# Patient Record
Sex: MALE | Race: WHITE | NOT HISPANIC OR LATINO | Employment: FULL TIME | URBAN - METROPOLITAN AREA
[De-identification: names, ages, dates, MRNs, and addresses within clinical notes are randomized per-mention and may not be internally consistent; named-entity substitution may affect disease eponyms.]

---

## 2021-05-03 ENCOUNTER — TRANSFERRED RECORDS (OUTPATIENT)
Dept: MULTI SPECIALTY CLINIC | Facility: CLINIC | Age: 56
End: 2021-05-03

## 2023-01-09 ASSESSMENT — ENCOUNTER SYMPTOMS
NERVOUS/ANXIOUS: 1
COUGH: 0
HEMATURIA: 0
FEVER: 0
WEAKNESS: 0
DIZZINESS: 0
DYSURIA: 0
NAUSEA: 0
EYE PAIN: 0
PALPITATIONS: 0
FREQUENCY: 1
MYALGIAS: 1
PARESTHESIAS: 0
JOINT SWELLING: 0
HEMATOCHEZIA: 1
HEARTBURN: 0
SHORTNESS OF BREATH: 0
ABDOMINAL PAIN: 0
CHILLS: 0
SORE THROAT: 0
ARTHRALGIAS: 1
HEADACHES: 0
CONSTIPATION: 0
DIARRHEA: 0

## 2023-01-11 ENCOUNTER — OFFICE VISIT (OUTPATIENT)
Dept: FAMILY MEDICINE | Facility: CLINIC | Age: 58
End: 2023-01-11
Payer: COMMERCIAL

## 2023-01-11 VITALS
HEIGHT: 70 IN | WEIGHT: 207 LBS | RESPIRATION RATE: 20 BRPM | HEART RATE: 71 BPM | SYSTOLIC BLOOD PRESSURE: 160 MMHG | DIASTOLIC BLOOD PRESSURE: 83 MMHG | OXYGEN SATURATION: 98 % | BODY MASS INDEX: 29.63 KG/M2 | TEMPERATURE: 98.6 F

## 2023-01-11 DIAGNOSIS — F10.10 ALCOHOL CONSUMPTION BINGE DRINKING: ICD-10-CM

## 2023-01-11 DIAGNOSIS — I25.10 CORONARY ARTERY DISEASE INVOLVING NATIVE HEART WITHOUT ANGINA PECTORIS, UNSPECIFIED VESSEL OR LESION TYPE: ICD-10-CM

## 2023-01-11 DIAGNOSIS — K21.9 GASTROESOPHAGEAL REFLUX DISEASE WITHOUT ESOPHAGITIS: ICD-10-CM

## 2023-01-11 DIAGNOSIS — F41.1 GAD (GENERALIZED ANXIETY DISORDER): ICD-10-CM

## 2023-01-11 DIAGNOSIS — E78.2 MIXED HYPERLIPIDEMIA: ICD-10-CM

## 2023-01-11 DIAGNOSIS — N52.9 ERECTILE DYSFUNCTION, UNSPECIFIED ERECTILE DYSFUNCTION TYPE: ICD-10-CM

## 2023-01-11 DIAGNOSIS — I10 BENIGN ESSENTIAL HYPERTENSION: ICD-10-CM

## 2023-01-11 DIAGNOSIS — Z13.220 SCREENING FOR HYPERLIPIDEMIA: ICD-10-CM

## 2023-01-11 DIAGNOSIS — Z00.00 ROUTINE HISTORY AND PHYSICAL EXAMINATION OF ADULT: Primary | ICD-10-CM

## 2023-01-11 PROBLEM — Z87.19 HISTORY OF DIVERTICULITIS: Status: ACTIVE | Noted: 2023-01-11

## 2023-01-11 LAB
ALBUMIN SERPL-MCNC: 4.1 G/DL (ref 3.4–5)
ALP SERPL-CCNC: 99 U/L (ref 40–150)
ALT SERPL W P-5'-P-CCNC: 37 U/L (ref 0–70)
ANION GAP SERPL CALCULATED.3IONS-SCNC: 3 MMOL/L (ref 3–14)
AST SERPL W P-5'-P-CCNC: 18 U/L (ref 0–45)
BILIRUB SERPL-MCNC: 0.6 MG/DL (ref 0.2–1.3)
BUN SERPL-MCNC: 13 MG/DL (ref 7–30)
CALCIUM SERPL-MCNC: 9.6 MG/DL (ref 8.5–10.1)
CHLORIDE BLD-SCNC: 108 MMOL/L (ref 94–109)
CHOLEST SERPL-MCNC: 132 MG/DL
CO2 SERPL-SCNC: 30 MMOL/L (ref 20–32)
CREAT SERPL-MCNC: 0.98 MG/DL (ref 0.66–1.25)
CREAT UR-MCNC: 189 MG/DL
ERYTHROCYTE [DISTWIDTH] IN BLOOD BY AUTOMATED COUNT: 12.7 % (ref 10–15)
FASTING STATUS PATIENT QL REPORTED: YES
GFR SERPL CREATININE-BSD FRML MDRD: >90 ML/MIN/1.73M2
GLUCOSE BLD-MCNC: 96 MG/DL (ref 70–99)
HCT VFR BLD AUTO: 42.4 % (ref 40–53)
HDLC SERPL-MCNC: 47 MG/DL
HGB BLD-MCNC: 14.7 G/DL (ref 13.3–17.7)
LDLC SERPL CALC-MCNC: 68 MG/DL
MCH RBC QN AUTO: 28.4 PG (ref 26.5–33)
MCHC RBC AUTO-ENTMCNC: 34.7 G/DL (ref 31.5–36.5)
MCV RBC AUTO: 82 FL (ref 78–100)
MICROALBUMIN UR-MCNC: 7 MG/L
MICROALBUMIN/CREAT UR: 3.7 MG/G CR (ref 0–17)
NONHDLC SERPL-MCNC: 85 MG/DL
PLATELET # BLD AUTO: 191 10E3/UL (ref 150–450)
POTASSIUM BLD-SCNC: 4.4 MMOL/L (ref 3.4–5.3)
PROT SERPL-MCNC: 7.1 G/DL (ref 6.8–8.8)
RBC # BLD AUTO: 5.17 10E6/UL (ref 4.4–5.9)
SODIUM SERPL-SCNC: 141 MMOL/L (ref 133–144)
TRIGL SERPL-MCNC: 84 MG/DL
WBC # BLD AUTO: 6.9 10E3/UL (ref 4–11)

## 2023-01-11 PROCEDURE — 82570 ASSAY OF URINE CREATININE: CPT | Performed by: PREVENTIVE MEDICINE

## 2023-01-11 PROCEDURE — 80053 COMPREHEN METABOLIC PANEL: CPT | Performed by: PREVENTIVE MEDICINE

## 2023-01-11 PROCEDURE — 36415 COLL VENOUS BLD VENIPUNCTURE: CPT | Performed by: PREVENTIVE MEDICINE

## 2023-01-11 PROCEDURE — 99214 OFFICE O/P EST MOD 30 MIN: CPT | Mod: 25 | Performed by: PREVENTIVE MEDICINE

## 2023-01-11 PROCEDURE — 85027 COMPLETE CBC AUTOMATED: CPT | Performed by: PREVENTIVE MEDICINE

## 2023-01-11 PROCEDURE — 82043 UR ALBUMIN QUANTITATIVE: CPT | Performed by: PREVENTIVE MEDICINE

## 2023-01-11 PROCEDURE — 80061 LIPID PANEL: CPT | Performed by: PREVENTIVE MEDICINE

## 2023-01-11 PROCEDURE — 99386 PREV VISIT NEW AGE 40-64: CPT | Performed by: PREVENTIVE MEDICINE

## 2023-01-11 RX ORDER — SILDENAFIL 100 MG/1
TABLET, FILM COATED ORAL
Qty: 30 TABLET | Refills: 4 | Status: SHIPPED | OUTPATIENT
Start: 2023-01-11 | End: 2024-03-06

## 2023-01-11 RX ORDER — LISINOPRIL 10 MG/1
10 TABLET ORAL DAILY
Qty: 90 TABLET | Refills: 3 | Status: SHIPPED | OUTPATIENT
Start: 2023-01-11 | End: 2024-02-27

## 2023-01-11 RX ORDER — SIMVASTATIN 40 MG
40 TABLET ORAL AT BEDTIME
Qty: 90 TABLET | Refills: 3 | Status: SHIPPED | OUTPATIENT
Start: 2023-01-11 | End: 2024-03-06

## 2023-01-11 ASSESSMENT — ENCOUNTER SYMPTOMS
SORE THROAT: 0
ARTHRALGIAS: 1
EYE PAIN: 0
HEADACHES: 0
HEARTBURN: 0
DIARRHEA: 0
ABDOMINAL PAIN: 0
WEAKNESS: 0
CONSTIPATION: 0
FEVER: 0
COUGH: 0
JOINT SWELLING: 0
MYALGIAS: 1
PALPITATIONS: 0
FREQUENCY: 1
HEMATOCHEZIA: 1
NERVOUS/ANXIOUS: 1
HEMATURIA: 0
DIZZINESS: 0
PARESTHESIAS: 0
CHILLS: 0
SHORTNESS OF BREATH: 0
DYSURIA: 0
NAUSEA: 0

## 2023-01-11 ASSESSMENT — PAIN SCALES - GENERAL: PAINLEVEL: NO PAIN (0)

## 2023-01-11 NOTE — PROGRESS NOTES
SUBJECTIVE:   CC: Los is an 57 year old who presents for preventative health visit.     Patient has been advised of split billing requirements and indicates understanding: Yes  Healthy Habits:     Getting at least 3 servings of Calcium per day:  Yes    Bi-annual eye exam:  NO    Dental care twice a year:  Yes    Sleep apnea or symptoms of sleep apnea:  Excessive snoring    Diet:  Regular (no restrictions)    Frequency of exercise:  2-3 days/week    Duration of exercise:  45-60 minutes    Taking medications regularly:  Yes    Medication side effects:  Not applicable    PHQ-2 Total Score: 0      Has not taken medication today  Out of all medication today  Blood pressure has been normal before     Hyperlipidemia Follow-Up      Are you regularly taking any medication or supplement to lower your cholesterol?   Yes- statin     Are you having muscle aches or other side effects that you think could be caused by your cholesterol lowering medication?  No    Hypertension Follow-up      Do you check your blood pressure regularly outside of the clinic? No     Are you following a low salt diet? Yes    Are your blood pressures ever more than 140 on the top number (systolic) OR more   than 90 on the bottom number (diastolic), for example 140/90? No    Vascular Disease Follow-up      How often do you take nitroglycerin? Never    Do you take an aspirin every day? Yes     No chest pain  Has had neck pain due to cervical changes  Has been followed by Cardiology in [place]   Angiogram 2-3 years ago       Has had snoring in the past, sleep study many years ago, had Mild sleep apnea, used CPAP for short time, was told his sleep apnea could be managed without CPAP, would like to defer Sleep evaluation for now.    GERD:  -taking medication for this  -no dysphagia     Mood symptoms:  -managed with Sertraline for anxiety  -no counseling  -no thoughts of self harm  -no substance use  -alcohol once a week, 8 drinks in one sitting, we  discussed avoiding binge drinking   -no passing out of alcohol     ED:  -takes Viagra as needed  -no use of nitrates and understands not to take together     Today's PHQ-2 Score:   PHQ-2 ( 1999 [employer]) 1/9/2023   Q1: Little interest or pleasure in doing things 0   Q2: Feeling down, depressed or hopeless 0   PHQ-2 Score 0   Q1: Little interest or pleasure in doing things Not at all   Q2: Feeling down, depressed or hopeless Not at all   PHQ-2 Score 0       Have you ever done Advance Care Planning? (For example, a Health Directive, POLST, or a discussion with a medical provider or your loved ones about your wishes): No, advance care planning information given to patient to review.  Patient declined advance care planning discussion at this time.    Social History     Tobacco Use     Smoking status: Former     Packs/day: 1.50     Years: 15.00     Pack years: 22.50     Types: Cigarettes     Smokeless tobacco: Not on file   Substance Use Topics     Alcohol use: Yes     Comment: 6-10 drinks weekly or every other week         Alcohol Use 1/9/2023   Prescreen: >3 drinks/day or >7 drinks/week? Yes   AUDIT SCORE  8       Last PSA: No results found for: PSA    Reviewed orders with patient. Reviewed health maintenance and updated orders accordingly - Yes  Lab work is in process  Labs reviewed in EPIC  BP Readings from Last 3 Encounters:   01/11/23 (!) 160/83   02/06/13 124/82   11/05/12 116/83    Wt Readings from Last 3 Encounters:   01/11/23 93.9 kg (207 lb)   02/06/13 88.5 kg (195 lb)   11/05/12 88.5 kg (195 lb)                  Patient Active Problem List   Diagnosis     Anxiety disorder     Atherosclerosis of coronary artery     History of diverticulitis     HTN (hypertension)     GERD (gastroesophageal reflux disease)     Dyslipidemia     Past Surgical History:   Procedure Laterality Date     COLONOSCOPY  11/5/2012    Procedure: COLONOSCOPY;  COLONOSCOPY;  Surgeon: Anoop Herman MD;  Location: [facility]       Social History      Tobacco Use     Smoking status: Former     Packs/day: 1.50     Years: 15.00     Pack years: 22.50     Types: Cigarettes     Smokeless tobacco: Not on file   Substance Use Topics     Alcohol use: Yes     Comment: 6-10 drinks weekly or every other week     Family History   Problem Relation Age of Onset     C.A.D. Father      C.A.D. Paternal Grandmother          Current Outpatient Medications   Medication Sig Dispense Refill     ASPIRIN ADULT LOW STRENGTH PO Take  by mouth.       lisinopril (ZESTRIL) 10 MG tablet Take 1 tablet (10 mg) by mouth daily For blood pressure 90 tablet 3     LISINOPRIL PO Take  by mouth.       LOVASTATIN PO Take  by mouth.       METOPROLOL TARTRATE PO Take  by mouth.       omeprazole (PRILOSEC) 20 MG DR capsule Take 1 capsule (20 mg) by mouth daily For heartburn 90 capsule 3     sertraline (ZOLOFT) 50 MG tablet Take 1 tablet (50 mg) by mouth daily For mood 90 tablet 1     sildenafil (VIAGRA) 100 MG tablet TAKE ONE TABLET BY MOUTH DAILY AS NEEDED FOR ERECTILE DYSFUNCTION. TAKE 30 MINUTES TO 4 HOURS BEFORE SEXUAL ACTIVITY. MAX 100MG PER 24 HOURS 30 tablet 4     simvastatin (ZOCOR) 40 MG tablet Take 1 tablet (40 mg) by mouth At Bedtime For cholesterol 90 tablet 3     No Known Allergies    Reviewed and updated as needed this visit by clinical staff   Tobacco  Allergies  Meds  Problems  Med Hx  Surg Hx  Fam Hx          Reviewed and updated as needed this visit by Provider   Tobacco  Allergies  Meds  Problems  Med Hx  Surg Hx  Fam Hx         Past Medical History:   Diagnosis Date     Coronary artery disease     MI age 41; 1 coronary stent placed     Hyperlipidemia      Hypertension       Past Surgical History:   Procedure Laterality Date     COLONOSCOPY  11/5/2012    Procedure: COLONOSCOPY;  COLONOSCOPY;  Surgeon: Anoop Herman MD;  Location:  GI       Review of Systems   Constitutional: Negative for chills and fever.   HENT: Negative for congestion, ear pain, hearing loss and  "sore throat.    Eyes: Negative for pain and visual disturbance.   Respiratory: Negative for cough and shortness of breath.    Cardiovascular: Negative for chest pain, palpitations and peripheral edema.   Gastrointestinal: Positive for hematochezia. Negative for abdominal pain, constipation, diarrhea, heartburn and nausea.   Genitourinary: Positive for frequency and impotence. Negative for dysuria, genital sores, hematuria, penile discharge and urgency.   Musculoskeletal: Positive for arthralgias and myalgias. Negative for joint swelling.   Skin: Negative for rash.   Neurological: Negative for dizziness, weakness, headaches and paresthesias.   Psychiatric/Behavioral: Negative for mood changes. The patient is nervous/anxious.        OBJECTIVE:   BP (!) 160/83 (BP Location: Left arm, Patient Position: Sitting, Cuff Size: Adult Large)   Pulse 71   Temp 98.6  F (37  C) (Temporal)   Resp 20   Ht 1.778 m (5' 10\")   Wt 93.9 kg (207 lb)   SpO2 98%   BMI 29.70 kg/m      Physical Exam  GENERAL APPEARANCE: healthy, alert and no distress  EYES: Eyes grossly normal to inspection and conjunctivae and sclerae normal  HENT: mouth without ulcers or lesions  NECK: no adenopathy and trachea midline and normal to palpation  RESP: lungs clear to auscultation - no rales, rhonchi or wheezes  CV: regular rates and rhythm, normal S1 S2  ABDOMEN: soft, non-tender and no rebound or guarding   MS: extremities normal- no gross deformities noted and peripheral pulses normal  SKIN: no suspicious lesions or rashes  NEURO: Normal strength and tone, mentation intact and speech normal  PSYCH: mentation appears normal      Diagnostic Test Results:  Labs reviewed in Epic  Results for orders placed or performed in visit on 01/11/23 (from the past 24 hour(s))   CBC with platelets   Result Value Ref Range    WBC Count 6.9 4.0 - 11.0 10e3/uL    RBC Count 5.17 4.40 - 5.90 10e6/uL    Hemoglobin 14.7 13.3 - 17.7 g/dL    Hematocrit 42.4 40.0 - 53.0 %    " MCV 82 78 - 100 fL    MCH 28.4 26.5 - 33.0 pg    MCHC 34.7 31.5 - 36.5 g/dL    RDW 12.7 10.0 - 15.0 %    Platelet Count 191 150 - 450 10e3/uL       ASSESSMENT/PLAN:   Los was seen today for physical.    Diagnoses and all orders for this visit:    Routine history and physical examination of adult  -     REVIEW OF HEALTH MAINTENANCE PROTOCOL ORDERS  -     Adult Eye  Referral; Future    Gastroesophageal reflux disease without esophagitis  -     omeprazole (PRILOSEC) 20 MG DR capsule; Take 1 capsule (20 mg) by mouth daily For heartburn  -     No red flags  -discussed that daily use of Proton pump inhibitor can cause irreversible bone loss   -     CBC with platelets    Benign essential hypertension  -     lisinopril (ZESTRIL) 10 MG tablet; Take 1 tablet (10 mg) by mouth daily For blood pressure  -   Has been out of his medication   -normally readings are good   -     Albumin Random Urine Quantitative with Creat Ratio  -     Comprehensive metabolic panel    Coronary artery disease involving native heart without angina pectoris, unspecified vessel or lesion type  -     simvastatin (ZOCOR) 40 MG tablet; Take 1 tablet (40 mg) by mouth At Bedtime For cholesterol  -     Comprehensive metabolic panel; Future  -     Comprehensive metabolic panel  -has been seen by Cardiology in the past  -Angiogram normal 2019    Mixed hyperlipidemia  -   Continue statin   -     Lipid panel reflex to direct LDL Non-fasting  -     Comprehensive metabolic panel    EDIS (generalized anxiety disorder)  -     sertraline (ZOLOFT) 50 MG tablet; Take 1 tablet (50 mg) by mouth daily For mood    We discussed the treatment for anxiety and depression in detail.  The importance of a multi faceted approach in controlling symptoms was reviewed.  The benefits of cognitive behavioral therapy reviewed, benefits of exercise, and stress reduction also discussed.      Erectile dysfunction, unspecified erectile dysfunction type  -     sildenafil (VIAGRA)  100 MG tablet; TAKE ONE TABLET BY MOUTH DAILY AS NEEDED FOR ERECTILE DYSFUNCTION. TAKE 30 MINUTES TO 4 HOURS BEFORE SEXUAL ACTIVITY. MAX 100MG PER 24 HOURS    Does not use any nitrates.     Alcohol consumption binge drinking  -we discussed reducing to less than 6 drinks in one sitting    Screening for hyperlipidemia  -     Lipid panel reflex to direct LDL Non-fasting          COUNSELING:   Reviewed preventive health counseling, as reflected in patient instructions       Regular exercise       Healthy diet/nutrition       Vision screening       Aspirin prophylaxis         He reports that he has quit smoking. His smoking use included cigarettes. He has a 22.50 pack-year smoking history. He does not have any smokeless tobacco history on file.            Maris Basurto MD MPH    [facility]

## 2023-01-11 NOTE — PATIENT INSTRUCTIONS
At [facility], we strive to deliver an exceptional experience to you, every time we see you. If you receive a survey, please complete it as we do value your feedback.  If you have MyChart, you can expect to receive results automatically within 24 hours of their completion.  Your provider will send a note interpreting your results as well.   If you do not have MyChart, you should receive your results in about a week by mail.    Your care team:                            Family Medicine Internal Medicine   MD Matthew Oliver MD Shantel Branch-Fleming, MD Srinivasa Vaka, MD Katya Belousova, PAMAURY Prescott CNP, MD ([facility]   Leonardo Richards, MD Bertha Mejía MD Amelia Massimini APRN HORTENSIA Will APRN MD Brandt Culver MD          Clinic hours: Monday - Thursday 7 am-6 pm; Fridays 7 am-5 pm.   Urgent care: Monday - Friday 10 am- 8 pm; Saturday and Sunday 9 am-5 pm.    Clinic: [phone]       [facility]: Monday - Thursday 8 am - 7 pm; Friday 8 am - 6 pm  [facility]: [phone]

## 2023-01-11 NOTE — LETTER
January 12, 2023      Los Salguero  [address] [address]        Dear ,    We are writing to inform you of your test results.    Urine sample is not showing any abnormal protein.   Cholesterol is at goal for you.   Electrolytes, glucose, kidney function and liver function tests are normal.   Basic blood count is not showing anemia or infection.   Plan of care and follow up as discussed in clinic.     Resulted Orders   CBC with platelets   Result Value Ref Range    WBC Count 6.9 4.0 - 11.0 10e3/uL    RBC Count 5.17 4.40 - 5.90 10e6/uL    Hemoglobin 14.7 13.3 - 17.7 g/dL    Hematocrit 42.4 40.0 - 53.0 %    MCV 82 78 - 100 fL    MCH 28.4 26.5 - 33.0 pg    MCHC 34.7 31.5 - 36.5 g/dL    RDW 12.7 10.0 - 15.0 %    Platelet Count 191 150 - 450 10e3/uL   Lipid panel reflex to direct LDL Non-fasting   Result Value Ref Range    Cholesterol 132 <200 mg/dL    Triglycerides 84 <150 mg/dL    Direct Measure HDL 47 >=40 mg/dL    LDL Cholesterol Calculated 68 <=100 mg/dL    Non HDL Cholesterol 85 <130 mg/dL    Patient Fasting > 8hrs? Yes     Narrative    Cholesterol  Desirable:  <200 mg/dL    Triglycerides  Normal:  Less than 150 mg/dL  Borderline High:  150-199 mg/dL  High:  200-499 mg/dL  Very High:  Greater than or equal to 500 mg/dL    Direct Measure HDL  Female:  Greater than or equal to 50 mg/dL   Male:  Greater than or equal to 40 mg/dL    LDL Cholesterol  Desirable:  <100mg/dL  Above Desirable:  100-129 mg/dL   Borderline High:  130-159 mg/dL   High:  160-189 mg/dL   Very High:  >= 190 mg/dL    Non HDL Cholesterol  Desirable:  130 mg/dL  Above Desirable:  130-159 mg/dL  Borderline High:  160-189 mg/dL  High:  190-219 mg/dL  Very High:  Greater than or equal to 220 mg/dL   Albumin Random Urine Quantitative with Creat Ratio   Result Value Ref Range    Creatinine Urine mg/dL 189 mg/dL    Albumin Urine mg/L 7 mg/L    Albumin Urine mg/g Cr 3.70 0.00 - 17.00 mg/g Cr   Comprehensive metabolic panel    Result Value Ref Range    Sodium 141 133 - 144 mmol/L    Potassium 4.4 3.4 - 5.3 mmol/L    Chloride 108 94 - 109 mmol/L    Carbon Dioxide (CO2) 30 20 - 32 mmol/L    Anion Gap 3 3 - 14 mmol/L    Urea Nitrogen 13 7 - 30 mg/dL    Creatinine 0.98 0.66 - 1.25 mg/dL    Calcium 9.6 8.5 - 10.1 mg/dL    Glucose 96 70 - 99 mg/dL    Alkaline Phosphatase 99 40 - 150 U/L    AST 18 0 - 45 U/L    ALT 37 0 - 70 U/L    Protein Total 7.1 6.8 - 8.8 g/dL    Albumin 4.1 3.4 - 5.0 g/dL    Bilirubin Total 0.6 0.2 - 1.3 mg/dL    GFR Estimate >90 >60 mL/min/1.73m2      Comment:      Effective December 21, 2021 eGFRcr in adults is calculated using the 2021 CKD-EPI creatinine equation which includes age and gender (Juan et al., NEJM, DOI: 10.1056/EUDSlx5930261)       If you have any questions or concerns, please call the clinic at the number listed above.       Sincerely,      Maris Basurto MD

## 2023-01-12 NOTE — RESULT ENCOUNTER NOTE
Please send a letter:    Dear Los Salguero,    Urine sample is not showing any abnormal protein.  Cholesterol is at goal for you.  Electrolytes, glucose, kidney function and liver function tests are normal.  Basic blood count is not showing anemia or infection.  Plan of care and follow up as discussed in clinic.    Please let me know if you have any questions and thank you for choosing [facility].    Regards,    Maris Basurto MD MPH

## 2023-02-24 ENCOUNTER — OFFICE VISIT (OUTPATIENT)
Dept: OPHTHALMOLOGY | Facility: CLINIC | Age: 58
End: 2023-02-24
Attending: PREVENTIVE MEDICINE
Payer: COMMERCIAL

## 2023-02-24 DIAGNOSIS — H01.01A ULCERATIVE BLEPHARITIS OF UPPER AND LOWER EYELIDS OF BOTH EYES: ICD-10-CM

## 2023-02-24 DIAGNOSIS — H01.01B ULCERATIVE BLEPHARITIS OF UPPER AND LOWER EYELIDS OF BOTH EYES: ICD-10-CM

## 2023-02-24 DIAGNOSIS — H02.831 DERMATOCHALASIS OF BOTH UPPER EYELIDS: ICD-10-CM

## 2023-02-24 DIAGNOSIS — H02.401 PTOSIS, RIGHT: Primary | ICD-10-CM

## 2023-02-24 DIAGNOSIS — H02.834 DERMATOCHALASIS OF BOTH UPPER EYELIDS: ICD-10-CM

## 2023-02-24 DIAGNOSIS — Z00.00 ROUTINE HISTORY AND PHYSICAL EXAMINATION OF ADULT: ICD-10-CM

## 2023-02-24 DIAGNOSIS — H52.4 PRESBYOPIA: ICD-10-CM

## 2023-02-24 PROCEDURE — 92004 COMPRE OPH EXAM NEW PT 1/>: CPT | Performed by: OPHTHALMOLOGY

## 2023-02-24 ASSESSMENT — CONF VISUAL FIELD
OS_NORMAL: 1
OD_SUPERIOR_NASAL_RESTRICTION: 0
OD_SUPERIOR_TEMPORAL_RESTRICTION: 0
OS_SUPERIOR_TEMPORAL_RESTRICTION: 0
OD_NORMAL: 1
OS_SUPERIOR_NASAL_RESTRICTION: 0
OD_INFERIOR_NASAL_RESTRICTION: 0
OS_INFERIOR_TEMPORAL_RESTRICTION: 0
OS_INFERIOR_NASAL_RESTRICTION: 0
OD_INFERIOR_TEMPORAL_RESTRICTION: 0
METHOD: COUNTING FINGERS

## 2023-02-24 ASSESSMENT — TONOMETRY
OS_IOP_MMHG: 11
OD_IOP_MMHG: 11
IOP_METHOD: ICARE

## 2023-02-24 ASSESSMENT — MARGIN REFLEX DISTANCE
OD_MRD2: 5
OD_MRD1: 5
OS_MRD2: 5
OS_MRD1: 6

## 2023-02-24 ASSESSMENT — SLIT LAMP EXAM - LIDS: COMMENTS: 2+ DERMATOCHALASIS, 1+ BLEPHARITIS

## 2023-02-24 ASSESSMENT — REFRACTION_MANIFEST
OD_ADD: +2.50
OS_SPHERE: PLANO
OD_SPHERE: PLANO
OS_ADD: +2.50
OS_CYLINDER: SPHERE
OD_CYLINDER: SPHERE

## 2023-02-24 ASSESSMENT — VISUAL ACUITY
OS_SC: 20/20
METHOD: SNELLEN - LINEAR
OD_SC: 20/20

## 2023-02-24 ASSESSMENT — CUP TO DISC RATIO
OS_RATIO: 0.25
OD_RATIO: 0.15

## 2023-02-24 NOTE — PROGRESS NOTES
HPI    Patient here for complete exam, last exam 3-4 years ago. Pt states his right eye itches more compared to the left. Also feels that the right lid is more drooped than the left. Uses OTC drops. OTC readers +3.50.     Denies diplopia. Right sided ptosis x 6 months. Notes more irritation. NO family history of ptosis. No trauma.     denies family history of ocular conditions  denies history of ocular surgeries   Last edited by Thomas Suarez MD on 2/24/2023  8:43 AM.         Review of systems for the eyes was negative other than the pertinent positives/negatives listed in the HPI.      Assessment & Plan    HPI:  Los Salguero is a 58 year old male with history of HTN, GERD, HLD, MDD, ED, presbyopia presents for eye exam. Notes right sided ptosis x 6 months with irritation right eye. Using ATs sporadically. No diplopia, no trauma.       POHx: presbyopia  PMHx: HTN, GERD, HLD, MDD, ED  Current Medications: ASPIRIN ADULT LOW STRENGTH PO, Take  by mouth.  lisinopril (ZESTRIL) 10 MG tablet, Take 1 tablet (10 mg) by mouth daily For blood pressure  LISINOPRIL PO, Take  by mouth.  omeprazole (PRILOSEC) 20 MG DR capsule, Take 1 capsule (20 mg) by mouth daily For heartburn  sertraline (ZOLOFT) 50 MG tablet, Take 1 tablet (50 mg) by mouth daily For mood  sildenafil (VIAGRA) 100 MG tablet, TAKE ONE TABLET BY MOUTH DAILY AS NEEDED FOR ERECTILE DYSFUNCTION. TAKE 30 MINUTES TO 4 HOURS BEFORE SEXUAL ACTIVITY. MAX 100MG PER 24 HOURS  simvastatin (ZOCOR) 40 MG tablet, Take 1 tablet (40 mg) by mouth At Bedtime For cholesterol    No current facility-administered medications on file prior to visit.    FHx: denies family history of ocular conditions   PSHx: denies history of ocular surgeries       Current Eye Medications:  Artificial tears every other day    Assessment & Plan:  (H02.401) Ptosis, right  (primary encounter diagnosis)  (H02.831,  H02.834) Dermatochalasis of both upper eyelids  Likely levator dehiscence and  dermatochalasis. Good orbic strength and no anisocoria noted pre dilation  Will refer to oculoplastics for undilated evaluation    (H52.4) Presbyopia  Presbyopia is difficulty seeing up close and is treated with bifocals or over the counter reading glasses    (H01.01A,  H01.01B) Ulcerative blepharitis of upper and lower eyelids of both eyes  Start artificial tears four times daily (best used before reading, using a computer or watching TV) and refresh pm or gel drop at bedtime   Start lid hygiene (Ocusoft lid scrubs or gently scrubbing the upper and lower lids baby with shampoo)        Return in about 2 years (around 2/24/2025) for Ali-ptosis/bleph eval next avail, Annual Visit.        Thomas Suarez MD     Attending Physician Attestation:  Complete documentation of historical and exam elements from today's encounter can be found in the full encounter summary report (not reduplicated in this progress note).  I personally obtained the chief complaint(s) and history of present illness.  I confirmed and edited as necessary the review of systems, past medical/surgical history, family history, social history, and examination findings as documented by others; and I examined the patient myself.  I personally reviewed the relevant tests, images, and reports as documented above.  I formulated and edited as necessary the assessment and plan and discussed the findings and management plan with the patient and family. - Thomas Suarez MD

## 2023-02-24 NOTE — PATIENT INSTRUCTIONS
"Use one drop of artificial tears both eyes 3-4 x daily.  Continue to use the drops regardless if your eyes are comfortable.  Artificial tears work best as a preventative and not as well after your eyes are starting to bother you.  Preservative-free drops are best if you will be using them more than 6x daily. Some brands include: Celluvisc, Refresh, Systane, Blink, Optive. Avoid using any drops that state \"get the red out\". Also, use lubricating artificial tear ointment at bedtime in both eyes every night.  Genteal and Refresh PM are preservative-free; generic brands and Lacrilube are not.    It may take 4-6 weeks of using the drops before you notice improvement.  If after that time you are still having problems schedule an appointment for an evaluation and discussion of different treatments which may include medicated eye drops, punctal plugs to keep the tears that are made and supplemented on the surface of the eye longer, or other treatments. Dry eyes are a chronic condition and you may have more symptoms at certain times of the year.    Start lid hygiene (Ocusoft lid scrubs or gently scrubbing the upper and lower lids baby with shampoo)  "

## 2023-02-24 NOTE — NURSING NOTE
Chief Complaints and History of Present Illnesses   Patient presents with     Annual Eye Exam       Chief Complaint(s) and History of Present Illness(es)     Annual Eye Exam           Comments    Patient here for complete exam, last exam 3-4 years ago. Pt states his right eye itches more compared to the left. Also feels that the right lid is more drooped than the left. Uses OTC drops. OTC readers +3.50.                  Dee Israel, COT

## 2023-03-01 ENCOUNTER — OFFICE VISIT (OUTPATIENT)
Dept: OPHTHALMOLOGY | Facility: CLINIC | Age: 58
End: 2023-03-01
Payer: COMMERCIAL

## 2023-03-01 DIAGNOSIS — H02.401 INVOLUTIONAL PTOSIS, ACQUIRED, RIGHT: ICD-10-CM

## 2023-03-01 DIAGNOSIS — H05.411 ENOPHTHALMOS DUE TO ATROPHY OF RIGHT ORBITAL TISSUE: Primary | ICD-10-CM

## 2023-03-01 PROCEDURE — 92081 LIMITED VISUAL FIELD XM: CPT | Performed by: OPHTHALMOLOGY

## 2023-03-01 PROCEDURE — 99214 OFFICE O/P EST MOD 30 MIN: CPT | Performed by: OPHTHALMOLOGY

## 2023-03-01 PROCEDURE — 92285 EXTERNAL OCULAR PHOTOGRAPHY: CPT | Performed by: OPHTHALMOLOGY

## 2023-03-01 ASSESSMENT — TONOMETRY
OD_IOP_MMHG: 10
IOP_METHOD: ICARE
OS_IOP_MMHG: 12

## 2023-03-01 ASSESSMENT — CONF VISUAL FIELD: COMMENTS: TANGENT VISUAL FIELD PERFORMED TODAY.

## 2023-03-01 ASSESSMENT — EXTERNAL EXAM - LEFT EYE: OS_EXAM: BROW PTOSIS

## 2023-03-01 ASSESSMENT — VISUAL ACUITY
OS_SC: 20/20
METHOD: SNELLEN - LINEAR
OD_SC: 20/20

## 2023-03-01 NOTE — LETTER
3/1/2023         RE: Los Salguero  [address]  [facility] [address]        Dear Colleague,    Thank you for referring your patient, Los Salguero, to the [facility]. Please see a copy of my visit note below.    Oculoplastic Clinic New Patient    Patient: Los Salguero MRN# [id number]   YOB: 1965 Age: 58 year old   Date of Visit: Mar 1, 2023    CC: Droopy eyelids obstructing vision.             HPI:     Chief Complaint(s) and History of Present Illness(es)     Droopy Eye Lid Evaluation            Laterality: right upper lid and left upper lid          Comments    Patient referred by Dr. Suarez for Ptosis evaluation. Patient states   that since this summer he has been having issues with his right upper lid   drooping. States the drooping will fluctuate, some good days, some bad.  Has been having issues with dry eyes and his eyes being red, using drops   to help.   States that at one point he had some double vision, but since he started   using TheraTears.        Los Salguero is a 58 year old male who has noted gradual onset of droopy eyelids over the past years. The droopy eyelid is interfering with activities of daily living including driving, and reading. When it started last summer had some double vision for about one day but resolved within 24 hours. He thinks eye drops helped with the double vision. Has not had since.     No thyroid issues  No trouble speaking or swallowing     EXAM:     MRD1: 2 mm right eye 3 mm left eye   Dermatochalasis with excess skin touching eyelashes on the left  Mild aponeurotic ptosis worse right eye   Brow ptosis with brow resting below superior orbital rim more noticeable on the left     VISUAL FIELD:  Right eye untaped:30 degrees Right eye taped:50 degrees  Left eye untaped:48 degrees Left eye taped:50 degrees    Assessment & Plan     Los Salguero is a 58 year old male with the following diagnoses:   1. 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 Involutional ptosis, acquired, right       We reviewed old pictures. He feels this started last summer, but review of old photos reveals some asymmetry 2-3 years ago.     His main complaint is right eye \"flares\" of irritation and redness.     Mildly enophthalmic and possibly hypoglobus right eye.   Denies sinus surgery but has always had a very \"deviated septum.\" Thinks may have had trauma to his eye many years ago but cannot think of a particular episode.    Will obtain a CT orbit to rule out silent sinus or old trauma as etiology of asymmetry then follow up with me. Can reassess if any eyelid surgery may help with his sensation of ocular irritation. We discussed it may not be eyelid related.         ANTICOAGULATION:    Aspirin            Attending Physician Attestation: Complete documentation of historical and exam elements from today's encounter can be found in the full encounter summary report (not reduplicated in this progress note). I personally obtained the chief complaint(s) and history of present illness. I confirmed and edited as necessary the review of systems, past medical/surgical history, family history, social history, and examination findings as documented by others; and I examined the patient myself. I personally reviewed the relevant tests, images, and reports as documented above. I formulated and edited as necessary the assessment and plan and discussed the findings and management plan with the patient. Meaghan Oliva MD                Again, thank you for allowing me to participate in the care of your patient.        Sincerely,        Meaghan Oliva MD  "

## 2023-03-01 NOTE — NURSING NOTE
Chief Complaints and History of Present Illnesses   Patient presents with     Droopy Eye Lid Evaluation       Chief Complaint(s) and History of Present Illness(es)     Droopy Eye Lid Evaluation            Laterality: right upper lid and left upper lid          Comments    Patient referred by Dr. Suarez for Ptosis evaluation. Patient states that since this summer he has been having issues with his right upper lid drooping. States the drooping will fluctuate, some good days, some bad.  Has been having issues with dry eyes and his eyes being red, using drops to help.   States that at one point he had some double vision, but since he started using TheraTears.                      Binh Diggs, Ophthalmic Assistant

## 2023-03-01 NOTE — PROGRESS NOTES
"[facility] New Patient    Patient: Los Salguero MRN# [id number]   YOB: 1965 Age: 58 year old   Date of Visit: Mar 1, 2023    CC: Droopy eyelids obstructing vision.              HPI:     Chief Complaint(s) and History of Present Illness(es)     Droopy Eye Lid Evaluation            Laterality: right upper lid and left upper lid          Comments    Patient referred by Dr. Suarez for Ptosis evaluation. Patient states   that since this summer he has been having issues with his right upper lid   drooping. States the drooping will fluctuate, some good days, some bad.  Has been having issues with dry eyes and his eyes being red, using drops   to help.   States that at one point he had some double vision, but since he started   using TheraTears.        Los Salguero is a 58 year old male who has noted gradual onset of droopy eyelids over the past years. The droopy eyelid is interfering with activities of daily living including driving, and reading. When it started last summer had some double vision for about one day but resolved within 24 hours. He thinks eye drops helped with the double vision. Has not had since.     No thyroid issues  No trouble speaking or swallowing     EXAM:     MRD1: 2 mm right eye 3 mm left eye   Dermatochalasis with excess skin touching eyelashes on the left  Mild aponeurotic ptosis worse right eye   Brow ptosis with brow resting below superior orbital rim more noticeable on the left     VISUAL FIELD:  Right eye untaped:30 degrees Right eye taped:50 degrees  Left eye untaped:48 degrees Left eye taped:50 degrees    Assessment & Plan     Los Salguero is a 58 year old male with the following diagnoses:   1. 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 Involutional ptosis, acquired, right       We reviewed old pictures. He feels this started last summer, but review of old photos reveals some asymmetry 2-3 years ago.     His main complaint is right eye \"flares\" " "of irritation and redness.     Mildly enophthalmic and possibly hypoglobus right eye.   Denies sinus surgery but has always had a very \"deviated septum.\" Thinks may have had trauma to his eye many years ago but cannot think of a particular episode.    Will obtain a CT orbit to rule out silent sinus or old trauma as etiology of asymmetry then follow up with me. Can reassess if any eyelid surgery may help with his sensation of ocular irritation. We discussed it may not be eyelid related.         ANTICOAGULATION:    Aspirin             Attending Physician Attestation: Complete documentation of historical and exam elements from today's encounter can be found in the full encounter summary report (not reduplicated in this progress note). I personally obtained the chief complaint(s) and history of present illness. I confirmed and edited as necessary the review of systems, past medical/surgical history, family history, social history, and examination findings as documented by others; and I examined the patient myself. I personally reviewed the relevant tests, images, and reports as documented above. I formulated and edited as necessary the assessment and plan and discussed the findings and management plan with the patient. Meaghan Oliva MD            "

## 2023-03-02 ENCOUNTER — ANCILLARY PROCEDURE (OUTPATIENT)
Dept: CT IMAGING | Facility: CLINIC | Age: 58
End: 2023-03-02
Attending: OPHTHALMOLOGY
Payer: COMMERCIAL

## 2023-03-02 DIAGNOSIS — H02.401 INVOLUTIONAL PTOSIS, ACQUIRED, RIGHT: ICD-10-CM

## 2023-03-02 DIAGNOSIS — H05.411 ENOPHTHALMOS DUE TO ATROPHY OF RIGHT ORBITAL TISSUE: ICD-10-CM

## 2023-03-02 PROCEDURE — 70480 CT ORBIT/EAR/FOSSA W/O DYE: CPT | Performed by: RADIOLOGY

## 2023-03-08 ENCOUNTER — OFFICE VISIT (OUTPATIENT)
Dept: OPHTHALMOLOGY | Facility: CLINIC | Age: 58
End: 2023-03-08
Payer: COMMERCIAL

## 2023-03-08 DIAGNOSIS — H02.539 EYELID RETRACTION OR LAG: ICD-10-CM

## 2023-03-08 DIAGNOSIS — H02.401 PTOSIS, RIGHT: Primary | ICD-10-CM

## 2023-03-08 DIAGNOSIS — H04.129 DRY EYE: ICD-10-CM

## 2023-03-08 PROCEDURE — 99213 OFFICE O/P EST LOW 20 MIN: CPT | Performed by: OPHTHALMOLOGY

## 2023-03-08 ASSESSMENT — TONOMETRY
IOP_METHOD: ICARE
OS_IOP_MMHG: 10
OD_IOP_MMHG: 09

## 2023-03-08 ASSESSMENT — VISUAL ACUITY
OS_SC: 20/20
METHOD: SNELLEN - LINEAR
OD_SC: 20/20

## 2023-03-08 NOTE — LETTER
3/8/2023         RE: Los Salguero  [address]  [place]  [place] [address]        Dear Colleague,    Thank you for referring your patient, Los Salguero, to the [facility]. Please see a copy of my visit note below.        Chief Complaint(s) and History of Present Illness(es)     Droopy Eye Lid Follow-Up            Laterality: right upper lid and left upper lid          Comments    Patient returning to discuss results of CT scan on 03/02/23.   States he has been using gel drops 3 x a day since the last visit and has   had noticeable improvement in the comfort of his eyes.         I personally reviewed his CT scans obtained 3/2/2023. There I no mass. There is no evidence of sinus pathology or evidence of an old fracture to explain his orbital asymmetry. He does have a small mucous retention cyst on the floor of the left maxillary sinus.       Assessment & Plan     Los Salguero is a 58 year old male with the following diagnoses:   Encounter Diagnoses   Name Primary?     Ptosis, right Yes     Involutional ptosis, acquired, right      Eyelid retraction or lag      He has moderate facial asymmetry resulting in deeper superior sulcus right eye, and he does have moderate right lower eyelid laxity with mild lower lid retraction on that side compared to the left.     The appearance does not bother him, his main issue was the irritation. He started artificial tear gel drops and has had no pain or discomfort since and is happy with that status. Should he become more symptomatic we could consider a conservative right lower eyelid retraction repair and possibly right upper eyelid posterior approach ptosis repair.     I will sign off on his care but happy to see him back should he become more symptomatic.         Attending Physician Attestation: Complete documentation of historical and exam elements from today's encounter can be found in the full encounter summary report (not reduplicated in  this progress note). I personally obtained the chief complaint(s) and history of present illness. I confirmed and edited as necessary the review of systems, past medical/surgical history, family history, social history, and examination findings as documented by others; and I examined the patient myself. I personally reviewed the relevant tests, images, and reports as documented above. I formulated and edited as necessary the assessment and plan and discussed the findings and management plan with the patient.  -Meaghan Oliva MD          Again, thank you for allowing me to participate in the care of your patient.        Sincerely,        Meaghan Oliva MD

## 2023-03-08 NOTE — PROGRESS NOTES
Chief Complaint(s) and History of Present Illness(es)     Droopy Eye Lid Follow-Up            Laterality: right upper lid and left upper lid          Comments    Patient returning to discuss results of CT scan on 03/02/23.   States he has been using gel drops 3 x a day since the last visit and has   had noticeable improvement in the comfort of his eyes.         I personally reviewed his CT scans obtained 3/2/2023. There I no mass. There is no evidence of sinus pathology or evidence of an old fracture to explain his orbital asymmetry. He does have a small mucous retention cyst on the floor of the left maxillary sinus.       Assessment & Plan     Los Salguero is a 58 year old male with the following diagnoses:   Encounter Diagnoses   Name Primary?     Ptosis, right Yes     Involutional ptosis, acquired, right      Eyelid retraction or lag      He has moderate facial asymmetry resulting in deeper superior sulcus right eye, and he does have moderate right lower eyelid laxity with mild lower lid retraction on that side compared to the left.     The appearance does not bother him, his main issue was the irritation. He started artificial tear gel drops and has had no pain or discomfort since and is happy with that status. Should he become more symptomatic we could consider a conservative right lower eyelid retraction repair and possibly right upper eyelid posterior approach ptosis repair.     I will sign off on his care but happy to see him back should he become more symptomatic.          Attending Physician Attestation: Complete documentation of historical and exam elements from today's encounter can be found in the full encounter summary report (not reduplicated in this progress note). I personally obtained the chief complaint(s) and history of present illness. I confirmed and edited as necessary the review of systems, past medical/surgical history, family history, social history, and examination findings as  documented by others; and I examined the patient myself. I personally reviewed the relevant tests, images, and reports as documented above. I formulated and edited as necessary the assessment and plan and discussed the findings and management plan with the patient.  -Meaghan Oliva MD

## 2023-03-08 NOTE — NURSING NOTE
Chief Complaints and History of Present Illnesses   Patient presents with     Droopy Eye Lid Follow-Up       Chief Complaint(s) and History of Present Illness(es)     Droopy Eye Lid Follow-Up            Laterality: right upper lid and left upper lid          Comments    Patient returning to discuss results of CT scan on 03/02/23.   States he has been using gel drops 3 x a day since the last visit and has had noticeable improvement in the comfort of his eyes.                  Binh Diggs, Ophthalmic Assistant

## 2023-03-14 ENCOUNTER — MYC MEDICAL ADVICE (OUTPATIENT)
Dept: OPHTHALMOLOGY | Facility: CLINIC | Age: 58
End: 2023-03-14
Payer: COMMERCIAL

## 2023-03-14 DIAGNOSIS — H04.129 DRY EYE: Primary | ICD-10-CM

## 2023-03-14 RX ORDER — CARBOXYMETHYLCELLULOSE SODIUM 10 MG/ML
1 GEL OPHTHALMIC AT BEDTIME
Qty: 30 EACH | Refills: 11 | Status: SHIPPED | OUTPATIENT
Start: 2023-03-14

## 2023-03-14 RX ORDER — CARBOXYMETHYLCELLULOSE SODIUM 5 MG/ML
1 SOLUTION/ DROPS OPHTHALMIC 4 TIMES DAILY
Qty: 15 ML | Refills: 11 | Status: SHIPPED | OUTPATIENT
Start: 2023-03-14

## 2023-04-03 ENCOUNTER — TELEPHONE (OUTPATIENT)
Dept: FAMILY MEDICINE | Facility: CLINIC | Age: 58
End: 2023-04-03
Payer: COMMERCIAL

## 2023-04-03 NOTE — TELEPHONE ENCOUNTER
Patient Quality Outreach    Patient is due for the following:       Topic Date Due     Hepatitis B Vaccine (1 of 3 - 3-dose series) Never done     Pneumococcal Vaccine (1 - PCV) Never done       Next Steps:   Schedule a nurse only visit for immunaztions     Type of outreach:    Sent [employer] message.      Questions for provider review:    None     Vi Herrera MA

## 2023-11-10 ENCOUNTER — TELEPHONE (OUTPATIENT)
Dept: FAMILY MEDICINE | Facility: CLINIC | Age: 58
End: 2023-11-10
Payer: COMMERCIAL

## 2023-11-10 NOTE — TELEPHONE ENCOUNTER
Patient Quality Outreach    Patient is due for the following:       Topic Date Due    Hepatitis B Vaccine (1 of 3 - 3-dose series) Never done    Pneumococcal Vaccine (1 - PCV) Never done    Flu Vaccine (1) 09/01/2023    COVID-19 Vaccine (6 - 2023-24 season) 09/01/2023       Next Steps:   Schedule a office visit for IMMUNIZATIONS     Type of outreach:    Sent [employer] message.    Next Steps:  Reach out within 90 days via [employer].    Max number of attempts reached: No. Will try again in 90 days if patient still on fail list.    Questions for provider review:    None           Vi Herrera MA

## 2024-01-03 DIAGNOSIS — K21.9 GASTROESOPHAGEAL REFLUX DISEASE WITHOUT ESOPHAGITIS: ICD-10-CM

## 2024-02-24 DIAGNOSIS — I10 BENIGN ESSENTIAL HYPERTENSION: ICD-10-CM

## 2024-02-26 RX ORDER — LISINOPRIL 10 MG/1
TABLET ORAL
Qty: 90 TABLET | Refills: 3 | OUTPATIENT
Start: 2024-02-26

## 2024-02-26 NOTE — TELEPHONE ENCOUNTER
Patient called - he is out of his isinopril (ZESTRIL) 10 MG tablet . He scheduled a med check for next week. Can he still get this filled?

## 2024-02-27 RX ORDER — LISINOPRIL 10 MG/1
10 TABLET ORAL DAILY
Qty: 90 TABLET | Refills: 0 | Status: SHIPPED | OUTPATIENT
Start: 2024-02-27 | End: 2024-03-06

## 2024-03-06 ENCOUNTER — VIRTUAL VISIT (OUTPATIENT)
Dept: FAMILY MEDICINE | Facility: CLINIC | Age: 59
End: 2024-03-06
Payer: COMMERCIAL

## 2024-03-06 DIAGNOSIS — E78.2 MIXED HYPERLIPIDEMIA: ICD-10-CM

## 2024-03-06 DIAGNOSIS — F10.10 ALCOHOL CONSUMPTION BINGE DRINKING: ICD-10-CM

## 2024-03-06 DIAGNOSIS — K21.9 GASTROESOPHAGEAL REFLUX DISEASE WITHOUT ESOPHAGITIS: ICD-10-CM

## 2024-03-06 DIAGNOSIS — N52.9 ERECTILE DYSFUNCTION, UNSPECIFIED ERECTILE DYSFUNCTION TYPE: ICD-10-CM

## 2024-03-06 DIAGNOSIS — I10 BENIGN ESSENTIAL HYPERTENSION: Primary | ICD-10-CM

## 2024-03-06 DIAGNOSIS — F41.1 GAD (GENERALIZED ANXIETY DISORDER): ICD-10-CM

## 2024-03-06 DIAGNOSIS — I25.10 CORONARY ARTERY DISEASE INVOLVING NATIVE HEART WITHOUT ANGINA PECTORIS, UNSPECIFIED VESSEL OR LESION TYPE: ICD-10-CM

## 2024-03-06 PROCEDURE — 99214 OFFICE O/P EST MOD 30 MIN: CPT | Mod: 95 | Performed by: PREVENTIVE MEDICINE

## 2024-03-06 RX ORDER — SIMVASTATIN 40 MG
40 TABLET ORAL AT BEDTIME
Qty: 90 TABLET | Refills: 3 | Status: SHIPPED | OUTPATIENT
Start: 2024-03-06

## 2024-03-06 RX ORDER — LISINOPRIL 10 MG/1
10 TABLET ORAL DAILY
Qty: 90 TABLET | Refills: 3 | Status: SHIPPED | OUTPATIENT
Start: 2024-03-06 | End: 2024-03-15

## 2024-03-06 RX ORDER — SILDENAFIL 100 MG/1
TABLET, FILM COATED ORAL
Qty: 30 TABLET | Refills: 4 | Status: SHIPPED | OUTPATIENT
Start: 2024-03-06

## 2024-03-06 NOTE — PROGRESS NOTES
"    Instructions Relayed to Patient by Virtual Roomer:     Patient is active on [employer]:   Relayed following to patient: \"It looks like you are active on [employer], are you able to join the visit this way? If not, do you need us to send you a link now or would you like your provider to send a link via text or email when they are ready to initiate the visit?\"    Reminded patient to ensure they were logged on to virtual visit by arrival time listed. Documented in appointment notes if patient had flexibility to initiate visit sooner than arrival time. If pediatric virtual visit, ensured pediatric patient along with parent/guardian will be present for video visit.     Patient offered the website [email or web address] and/or phone number to [employer] Help line: [phone]    Los is a 59 year old who is being evaluated via a billable video visit.      How would you like to obtain your AVS? [employer]  If the video visit is dropped, the invitation should be resent by: Text to cell phone: [phone]  Will anyone else be joining your video visit? No          Assessment & Plan     Benign essential hypertension  -scheduled on Ancillary for blood pressure check   - Albumin Random Urine Quantitative with Creat Ratio  - Comprehensive metabolic panel  - lisinopril (ZESTRIL) 10 MG tablet  Dispense: 90 tablet; Refill: 3    Mixed hyperlipidemia  -on statin   - Lipid panel reflex to direct LDL Non-fasting  - Comprehensive metabolic panel    LDL Cholesterol Calculated   Date Value Ref Range Status   01/11/2023 68 <=100 mg/dL Final         EDIS (generalized anxiety disorder)  -Patient tapered off sertraline, felt that dry eyes were being caused by the medication  -At this time anxiety symptoms are adequately managed without medication, does not want to be on any medication at this time  - Comprehensive metabolic panel    Alcohol consumption binge drinking  -9-10 drinks in 1 sitting  -Discussed avoiding binge " drinking  -Declined counseling    Coronary artery disease involving native heart without angina pectoris, unspecified vessel or lesion type  - Comprehensive metabolic panel  - simvastatin (ZOCOR) 40 MG tablet  Dispense: 90 tablet; Refill: 3  -Continue aspirin 81 mg daily  - NM Lexiscan stress test  -I ordered a nuclear stress test due to history of coronary artery disease and RCA stent previously placed.  Patient is describing neck pain that radiates down to his arm and involves his chest, most likely musculoskeletal in origin but will do the stress test to rule out cardiac causes of this pain  -coronary angiogram done April 2019:    Right dominant   LM - mild luminal irregularities   LAD - mild disease   LCx - small territory (supplies OM1 or Ramus territory as large RPLA supplies majority of lateral myocardium), ostial 50%, remainder of vessel with mild disease   RCA - mRCA patent stent, remainder of vessel with mild disease     Left Heart Catheterization:   LVEDP - 9 mmHg   No LV angiogram performed to limit contrast exposure.   No evidence of aortic stenosis.     Erectile dysfunction, unspecified erectile dysfunction type  - sildenafil (VIAGRA) 100 MG tablet  Dispense: 30 tablet; Refill: 4  -does not use nitrates    Gastroesophageal reflux disease without esophagitis  - omeprazole (PRILOSEC) 20 MG DR capsule  Dispense: 90 capsule; Refill: 3  -     No red flags  -discussed that daily use of Proton pump inhibitor can cause irreversible bone loss   -can try over the counter Famotidine     Ordering of each unique test  Prescription drug management  35 minutes spent by me on the date of the encounter doing chart review, history and exam, documentation and further activities per the note    Brigido   Los is a 59 year old, presenting for the following health issues:  Hypertension and Lipids        3/6/2024     2:49 PM   Additional Questions   Roomed by nay   Accompanied by self     History of Present Illness        Hyperlipidemia:  He presents for follow up of hyperlipidemia.   He is taking medication to lower cholesterol. He is not having myalgia or other side effects to statin medications.    Hypertension: He presents for follow up of hypertension.  He does not check blood pressure  regularly outside of the clinic. Outside blood pressures have been over 140/90. He does not follow a low salt diet.         Right eye had started drooping    Hyperlipidemia Follow-Up     Are you regularly taking any medication or supplement to lower your cholesterol?   Yes- statin   Are you having muscle aches or other side effects that you think could be caused by your cholesterol lowering medication?  No  Fatigue with the medication+       Hypertension Follow-up     Do you check your blood pressure regularly outside of the clinic? No   Are you following a low salt diet? Yes  Are your blood pressures ever more than 140 on the top number (systolic) OR more       than 90 on the bottom number (diastolic), for example 140/90? No     Vascular Disease Follow-up     How often do you take nitroglycerin? Never  Do you take an aspirin every day? Yes      No chest pain  Has had neck pain due to cervical changes  Has been followed by Cardiology in [place]   Angiogram 2019         Has had snoring in the past, sleep study many years ago, had Mild sleep apnea, used CPAP for short time, was told his sleep apnea could be managed without CPAP, would like to defer Sleep evaluation for now.     GERD:  -taking medication for this  -no dysphagia   -tried to take every other day but this was not helping symptoms      Mood symptoms:  -managed with Sertraline for anxiety, patient tapered off the medication due to concerns for dry eye and ptosis, has been seen by [facility]  -no counseling  -no thoughts of self harm  -no substance use  -alcohol once a week, 8 drinks in one sitting, we discussed avoiding binge drinking   -no passing out of alcohol      ED:  -takes  Viagra as needed  -no use of nitrates and understands not to take together     Neck pain:  -has been working with his chiropractor  -declined any extensive intervention  -not life altering but not able to play pickleball  -pain starts in the neck, gets better with applying ice    Normally drinks 9-10 drinks at a time        Objective           Vitals:  No vitals were obtained today due to virtual visit.    Physical Exam   GENERAL: alert and no distress  EYES: Eyes grossly normal to inspection.  No discharge or erythema, or obvious scleral/conjunctival abnormalities.  RESP: No audible wheeze, cough, or visible cyanosis.    SKIN: Visible skin clear. No significant rash, abnormal pigmentation or lesions.  NEURO: Cranial nerves grossly intact.  Mentation and speech appropriate for age.  PSYCH: Appropriate affect, tone, and pace of words    No results found for this or any previous visit (from the past 24 hour(s)).      Video-Visit Details    Type of service:  Video Visit     Originating Location (pt. Location): Home    Distant Location (provider location):  On-site  Platform used for Video Visit: Christina  Signed Electronically by: Maris Basurto MD MPH

## 2024-03-15 ENCOUNTER — ALLIED HEALTH/NURSE VISIT (OUTPATIENT)
Dept: FAMILY MEDICINE | Facility: CLINIC | Age: 59
End: 2024-03-15
Payer: COMMERCIAL

## 2024-03-15 ENCOUNTER — LAB (OUTPATIENT)
Dept: LAB | Facility: CLINIC | Age: 59
End: 2024-03-15
Payer: COMMERCIAL

## 2024-03-15 VITALS — HEART RATE: 80 BPM | SYSTOLIC BLOOD PRESSURE: 145 MMHG | DIASTOLIC BLOOD PRESSURE: 82 MMHG

## 2024-03-15 DIAGNOSIS — F41.1 GAD (GENERALIZED ANXIETY DISORDER): ICD-10-CM

## 2024-03-15 DIAGNOSIS — I10 BENIGN ESSENTIAL HYPERTENSION: Primary | ICD-10-CM

## 2024-03-15 DIAGNOSIS — E78.2 MIXED HYPERLIPIDEMIA: ICD-10-CM

## 2024-03-15 DIAGNOSIS — I25.10 CORONARY ARTERY DISEASE INVOLVING NATIVE HEART WITHOUT ANGINA PECTORIS, UNSPECIFIED VESSEL OR LESION TYPE: ICD-10-CM

## 2024-03-15 DIAGNOSIS — I10 BENIGN ESSENTIAL HYPERTENSION: ICD-10-CM

## 2024-03-15 PROCEDURE — 36415 COLL VENOUS BLD VENIPUNCTURE: CPT

## 2024-03-15 PROCEDURE — 99207 PR NO CHARGE NURSE ONLY: CPT

## 2024-03-15 PROCEDURE — 80053 COMPREHEN METABOLIC PANEL: CPT

## 2024-03-15 PROCEDURE — 82043 UR ALBUMIN QUANTITATIVE: CPT

## 2024-03-15 PROCEDURE — 82570 ASSAY OF URINE CREATININE: CPT

## 2024-03-15 PROCEDURE — 80061 LIPID PANEL: CPT

## 2024-03-15 RX ORDER — LISINOPRIL 10 MG/1
20 TABLET ORAL DAILY
Status: SHIPPED
Start: 2024-03-15 | End: 2024-04-18

## 2024-03-15 NOTE — PROGRESS NOTES
I met with Los Salguero at the request of Dr. Basurto to recheck his blood pressure.  Blood pressure medications on the med list were reviewed with patient.    Patient has taken all medications as per usual regimen: Yes. Lisinopril 10 mg   Patient reports tolerating them without any issues or concerns: No    There were no vitals filed for this visit.    Blood pressure was taken, previous encounter was reviewed, Informed Dr. Basurto. Stated ok for patient to leave and will message patient via moziy regarding recommendations.     Patient informed. Trixie Avery MA

## 2024-03-16 LAB
ALBUMIN SERPL BCG-MCNC: 4.4 G/DL (ref 3.5–5.2)
ALP SERPL-CCNC: 104 U/L (ref 40–150)
ALT SERPL W P-5'-P-CCNC: 30 U/L (ref 0–70)
ANION GAP SERPL CALCULATED.3IONS-SCNC: 13 MMOL/L (ref 7–15)
AST SERPL W P-5'-P-CCNC: 22 U/L (ref 0–45)
BILIRUB SERPL-MCNC: 0.6 MG/DL
BUN SERPL-MCNC: 14.5 MG/DL (ref 6–20)
CALCIUM SERPL-MCNC: 9.6 MG/DL (ref 8.6–10)
CHLORIDE SERPL-SCNC: 103 MMOL/L (ref 98–107)
CHOLEST SERPL-MCNC: 139 MG/DL
CREAT SERPL-MCNC: 1.06 MG/DL (ref 0.67–1.17)
CREAT UR-MCNC: 35.8 MG/DL
DEPRECATED HCO3 PLAS-SCNC: 24 MMOL/L (ref 22–29)
EGFRCR SERPLBLD CKD-EPI 2021: 81 ML/MIN/1.73M2
FASTING STATUS PATIENT QL REPORTED: YES
GLUCOSE SERPL-MCNC: 83 MG/DL (ref 70–99)
HDLC SERPL-MCNC: 42 MG/DL
LDLC SERPL CALC-MCNC: 72 MG/DL
MICROALBUMIN UR-MCNC: <12 MG/L
MICROALBUMIN/CREAT UR: NORMAL MG/G{CREAT}
NONHDLC SERPL-MCNC: 97 MG/DL
POTASSIUM SERPL-SCNC: 4.2 MMOL/L (ref 3.4–5.3)
PROT SERPL-MCNC: 7.2 G/DL (ref 6.4–8.3)
SODIUM SERPL-SCNC: 140 MMOL/L (ref 135–145)
TRIGL SERPL-MCNC: 124 MG/DL

## 2024-03-17 ENCOUNTER — HEALTH MAINTENANCE LETTER (OUTPATIENT)
Age: 59
End: 2024-03-17

## 2024-03-18 NOTE — RESULT ENCOUNTER NOTE
Los, your test results were within normal limits.  Cholesterol is at goal.  Urine sample is not showing any abnormal protein.  Electrolytes, glucose, kidney function and liver function tests are normal.    Please do not hesitate to call us at [phone] if you have any questions or concerns.    Thank you,    Maris Bausrto MD MPH

## 2024-03-21 ENCOUNTER — TELEPHONE (OUTPATIENT)
Dept: FAMILY MEDICINE | Facility: CLINIC | Age: 59
End: 2024-03-21
Payer: COMMERCIAL

## 2024-03-21 NOTE — TELEPHONE ENCOUNTER
Patient Quality Outreach    Patient is due for the following:   Colon Cancer Screening  Physical Preventive Adult Physical      Topic Date Due    Pneumococcal Vaccine (1 of 2 - PCV) Never done    Hepatitis B Vaccine (1 of 3 - 19+ 3-dose series) Never done       Next Steps:   Schedule a Adult Preventative    Type of outreach:    Sent [email or web address] message.      Questions for provider review:    None           Jb Rodas MA

## 2024-03-25 ENCOUNTER — HOSPITAL ENCOUNTER (OUTPATIENT)
Dept: CARDIOLOGY | Facility: CLINIC | Age: 59
Discharge: HOME | End: 2024-03-25
Attending: PREVENTIVE MEDICINE
Payer: COMMERCIAL

## 2024-03-25 ENCOUNTER — HOSPITAL ENCOUNTER (OUTPATIENT)
Dept: NUCLEAR MEDICINE | Facility: CLINIC | Age: 59
Setting detail: NUCLEAR MEDICINE
Discharge: HOME | End: 2024-03-25
Attending: PREVENTIVE MEDICINE
Payer: COMMERCIAL

## 2024-03-25 DIAGNOSIS — I25.10 CORONARY ARTERY DISEASE INVOLVING NATIVE HEART WITHOUT ANGINA PECTORIS, UNSPECIFIED VESSEL OR LESION TYPE: ICD-10-CM

## 2024-03-25 LAB
CV STRESS MAX HR HE: 84
RATE PRESSURE PRODUCT: NORMAL
STRESS ECHO BASELINE DIASTOLIC HE: 83
STRESS ECHO BASELINE HR: 72 BPM
STRESS ECHO BASELINE SYSTOLIC BP: 147
STRESS ECHO CALCULATED PERCENT HR: 52 %
STRESS ECHO LAST STRESS DIASTOLIC BP: 78
STRESS ECHO LAST STRESS SYSTOLIC BP: 167
STRESS ECHO TARGET HR: 162

## 2024-03-25 PROCEDURE — 78452 HT MUSCLE IMAGE SPECT MULT: CPT | Mod: 26 | Performed by: INTERNAL MEDICINE

## 2024-03-25 PROCEDURE — 93018 CV STRESS TEST I&R ONLY: CPT | Performed by: INTERNAL MEDICINE

## 2024-03-25 PROCEDURE — 343N000001 HC RX 343: Performed by: PREVENTIVE MEDICINE

## 2024-03-25 PROCEDURE — 78452 HT MUSCLE IMAGE SPECT MULT: CPT

## 2024-03-25 PROCEDURE — 93016 CV STRESS TEST SUPVJ ONLY: CPT | Performed by: INTERNAL MEDICINE

## 2024-03-25 PROCEDURE — A9502 TC99M TETROFOSMIN: HCPCS | Performed by: PREVENTIVE MEDICINE

## 2024-03-25 PROCEDURE — 93017 CV STRESS TEST TRACING ONLY: CPT

## 2024-03-25 PROCEDURE — 250N000011 HC RX IP 250 OP 636: Performed by: INTERNAL MEDICINE

## 2024-03-25 RX ORDER — AMINOPHYLLINE 25 MG/ML
50-100 INJECTION, SOLUTION INTRAVENOUS
Status: DISCONTINUED | OUTPATIENT
Start: 2024-03-25 | End: 2024-03-26 | Stop reason: HOSPADM

## 2024-03-25 RX ORDER — ALBUTEROL SULFATE 90 UG/1
2 AEROSOL, METERED RESPIRATORY (INHALATION) EVERY 5 MIN PRN
Status: DISCONTINUED | OUTPATIENT
Start: 2024-03-25 | End: 2024-03-26 | Stop reason: HOSPADM

## 2024-03-25 RX ORDER — ACYCLOVIR 200 MG/1
0-1 CAPSULE ORAL
Status: DISCONTINUED | OUTPATIENT
Start: 2024-03-25 | End: 2024-03-26 | Stop reason: HOSPADM

## 2024-03-25 RX ORDER — CAFFEINE CITRATE 20 MG/ML
60 SOLUTION INTRAVENOUS
Status: DISCONTINUED | OUTPATIENT
Start: 2024-03-25 | End: 2024-03-26 | Stop reason: HOSPADM

## 2024-03-25 RX ORDER — REGADENOSON 0.08 MG/ML
0.4 INJECTION, SOLUTION INTRAVENOUS ONCE
Status: COMPLETED | OUTPATIENT
Start: 2024-03-25 | End: 2024-03-25

## 2024-03-25 RX ADMIN — TETROFOSMIN 11.7 MILLICURIE: 1.38 INJECTION, POWDER, LYOPHILIZED, FOR SOLUTION INTRAVENOUS at 07:15

## 2024-03-25 RX ADMIN — REGADENOSON 0.4 MG: 0.08 INJECTION, SOLUTION INTRAVENOUS at 08:18

## 2024-03-25 RX ADMIN — TETROFOSMIN 42 MILLICURIE: 1.38 INJECTION, POWDER, LYOPHILIZED, FOR SOLUTION INTRAVENOUS at 08:22

## 2024-03-26 NOTE — RESULT ENCOUNTER NOTE
Los,    No abnormalities seen on images of lower chest and upper abdomen.     Please do not hesitate to call us at [phone] if you have any questions or concerns.    Thank you,    Maris Basurto MD MPH

## 2024-03-26 NOTE — RESULT ENCOUNTER NOTE
Los, your test results were within normal limits.  The stress test did not show any abnormalities.     Please do not hesitate to call us at [phone] if you have any questions or concerns.    Thank you,    Maris Basurto MD MPH

## 2024-04-10 NOTE — PROGRESS NOTES
SUBJECTIVE:    Los Salguero  Is a 59 year old male who presents for new patient evaluation of neck pain self-referred.  No records in the chart.  I do note a history of MI at age 41     Chiropractic (20 sessions) and home exercise.  His primary care doctor checked his heart out and the does not appear to be formal coronary origin.    The onset was sudden about a year ago while he was playing pickle ball and he started noticing tingling in the front and back of his torso and tingling down both of his arms.  It has not responded to time and conservative care.  The pain is waking him at night several times.  There is no motor or sensory deficits anywhere including in the legs and saddle area, no bowel or bladder dysfunction, and no sexual dysfunction.  No other red flags on review of systems.  In the past years, chiropractic is usually taken his pain away but this time it did not work.  He for started having the problems he was having left arm spasms and that went away with chiropractic care.      SYMPTOMS WORSENED WITH sleeping, walking, playing pickle ball, getting off a chair    SYMPTOMS IMPROVED WITH ice and the passage of time    TREATMENTS TRIED as above    Pain score, and diagram reviewed.  See questionnaire.      ROS:  .    Otherwise negative for bowel/bladder retention, dysphagia, imbalance/falls, difficulty with fine motor skills, and otherwise unremarkable.     See the patient's intake questionnaire for details.    Medications:  Reviewed.    Allergies Reviewed.  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  And his wife have no children.  Sports hobbies and activities: Pickleball, walking    OBJECTIVE:    IMAGING: Images and reports reviewed.     XR CERVICAL SPINE 2/3 VIEWS: 12/1/2023 report only no images                                                                IMPRESSION: Straightening of usual cervical lordosis without significant spondylolisthesis. No acute fracture. Scattered multilevel degenerative change most prominent at C5-C6 where there is mild disc height loss.    PHYSICAL EXAMINATION:    CONSTITUTIONAL:   No acute distress.  The patient is well nourished and well groomed.  Transitions without pushoff and moves fluidly about the room.  BMI is appropriate.  PSYCHIATRIC:  The patient is awake, alert, oriented to person, place, time and answering questions appropriately with clear speech.    SKIN:  Skin over the face, bilateral upper extremities, and posterior torso is clean, dry, intact without rashes.  MUSCULOSKELETAL:   Negative scapular dyskinesis.   Shoulder range of motion: Full and painless.  Rotator cuff strength 5/5.     Elbow wrist and hand range of motion full and painless .   Thoracic range of motion with overpressure painless   Ribs: No symptoms with respiratory excursion   Chest wall compression: Painless  Cervical range of motion full in extension and flexion.  Minimal decreased range of motion and hard endpoint and nonradiating minimal neck pain with sidebending and rotation  Spurling's maneuver:   negative  for radiating symptoms.  Negative Lhermitte's.  Negative meningismus  Palpation of upper quadrant:    Negative tenderness, spasm,  nor active trigger points radiating pain.    NEURO:   Mental status:  See Psychiatric above.  CN III-XII are grossly intact.    Gait (flat feet/heels/toes), squat/rise, normal.  Tandem walk, Romberg Pronator drift normal.  Sensation to light touch normal in torso and upper extremities .   Strength:  5/5 strength in C5-T1 myotomes, axillary/medial/radial/ulnar nerves .   Martin's/Tronder's negative     VASCULAR:   Capillary refill, temperature and color in the upper extremities is normal and symmetric.      ASSESSMENT:     Jaskaran Edwards is a 44 year old male who presents  today for new patient evaluation of   Sudden onset chronic neck pain x 9 months  Chronic thoracic back pain  Nonresponse to chiropractic sessions x 20 and no evidence of somatic dysfunction cervical, thoracic, rib cage  Neurologically intact  Persistent tingling in the torso and upper extremities in a nondermatomal fashion  No myelopathic findings      DISCUSSION/PLAN:  Cervical and thoracic MRI and follow-up here to discuss afterwards.    5/14/2024 addendum:     He was too claustrophobic to tolerate the MRI so we will reschedule it and I have called in some Valium for him to use.  He was advised to get to the facility 1 hour earlier and not to drive to or from the facility.

## 2024-04-17 ENCOUNTER — OFFICE VISIT (OUTPATIENT)
Dept: NEUROSURGERY | Facility: CLINIC | Age: 59
End: 2024-04-17
Payer: COMMERCIAL

## 2024-04-17 VITALS
DIASTOLIC BLOOD PRESSURE: 99 MMHG | BODY MASS INDEX: 29.63 KG/M2 | HEIGHT: 70 IN | WEIGHT: 207 LBS | HEART RATE: 97 BPM | SYSTOLIC BLOOD PRESSURE: 153 MMHG

## 2024-04-17 DIAGNOSIS — R20.2 NUMBNESS AND TINGLING OF UPPER EXTREMITY: ICD-10-CM

## 2024-04-17 DIAGNOSIS — F40.240 CLAUSTROPHOBIA: ICD-10-CM

## 2024-04-17 DIAGNOSIS — M54.2 NECK PAIN: Primary | ICD-10-CM

## 2024-04-17 DIAGNOSIS — G89.29 CHRONIC BILATERAL THORACIC BACK PAIN: ICD-10-CM

## 2024-04-17 DIAGNOSIS — R20.0 NUMBNESS AND TINGLING OF UPPER EXTREMITY: ICD-10-CM

## 2024-04-17 DIAGNOSIS — M54.6 CHRONIC BILATERAL THORACIC BACK PAIN: ICD-10-CM

## 2024-04-17 PROCEDURE — 99204 OFFICE O/P NEW MOD 45 MIN: CPT | Performed by: PREVENTIVE MEDICINE

## 2024-04-17 ASSESSMENT — PAIN SCALES - GENERAL: PAINLEVEL: MODERATE PAIN (4)

## 2024-04-17 NOTE — PATIENT INSTRUCTIONS
Quentin let me see you back after we get your MRIs and see if that gives us a clue but I do expect that it is going to show typical aging changes that will not excite me.  I am looking for surprises that explain your symptoms.

## 2024-04-17 NOTE — NURSING NOTE
"Reason For Visit:   Chief Complaint   Patient presents with    New Patient     Neck pain // has episodes throughout day time doesn't matter . Can't use regular pillow. No sports // feels like a heart attack... 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 Yes.  Work status?  Full time.    Sports: pickleball   Activities: lifting              BP (!) 153/99   Pulse 97   Ht 1.778 m (5' 10\")   Wt 93.9 kg (207 lb)   BMI 29.70 kg/m        No Known Allergies    Current Outpatient Medications   Medication Sig Dispense Refill    ASPIRIN ADULT LOW STRENGTH PO Take  by mouth.      carboxymethylcellulose (CARBOXYMETHYLCELLULOSE SODIUM) 0.5 % SOLN ophthalmic solution Place 1 drop into both eyes 4 times daily 15 mL 11    carboxymethylcellulose PF (REFRESH LIQUIGEL) 1 % ophthalmic gel Place 1 drop into both eyes At Bedtime 30 each 11    lisinopril (ZESTRIL) 10 MG tablet Take 2 tablets (20 mg) by mouth daily For blood pressure      LISINOPRIL PO Take  by mouth.      omeprazole (PRILOSEC) 20 MG DR capsule TAKE 1 CAPSULE(20 MG) BY MOUTH DAILY FOR HEARTBURN 90 capsule 3    sildenafil (VIAGRA) 100 MG tablet TAKE ONE TABLET BY MOUTH DAILY AS NEEDED FOR ERECTILE DYSFUNCTION. TAKE 30 MINUTES TO 4 HOURS BEFORE SEXUAL ACTIVITY. MAX 100MG PER 24 HOURS 30 tablet 4    simvastatin (ZOCOR) 40 MG tablet Take 1 tablet (40 mg) by mouth at bedtime For cholesterol 90 tablet 3     No current facility-administered medications for this visit.         Delfino Gilbert MA    "

## 2024-04-17 NOTE — LETTER
4/17/2024         RE: Los Salguero  [address]  [place] [address]        Dear Colleague,    Thank you for referring your patient, Los Salguero, to the [facility]OS[facility]ERY [facility]INIC [place]. Please see a copy of my visit note below.      SUBJECTIVE:    Los Salguero  Is a 59 year old male who presents for new patient evaluation of neck pain self-referred.  No records in the chart.  I do note a history of MI at age 41     Chiropractic (20 sessions) and home exercise.  His primary care doctor checked his heart out and the does not appear to be formal coronary origin.    The onset was sudden about a year ago while he was playing pickle ball and he started noticing tingling in the front and back of his torso and tingling down both of his arms.  It has not responded to time and conservative care.  The pain is waking him at night several times.  There is no motor or sensory deficits anywhere including in the legs and saddle area, no bowel or bladder dysfunction, and no sexual dysfunction.  No other red flags on review of systems.  In the past years, chiropractic is usually taken his pain away but this time it did not work.  He for started having the problems he was having left arm spasms and that went away with chiropractic care.      SYMPTOMS WORSENED WITH sleeping, walking, playing pickle ball, getting off a chair    SYMPTOMS IMPROVED WITH ice and the passage of time    TREATMENTS TRIED as above    Pain score, and diagram reviewed.  See questionnaire.      ROS:  .    Otherwise negative for bowel/bladder retention, dysphagia, imbalance/falls, difficulty with fine motor skills, and otherwise unremarkable.     See the patient's intake questionnaire for details.    Medications:  Reviewed.    Allergies Reviewed.  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  And his wife have no children.  Sports hobbies and activities: Pickleball, walking    OBJECTIVE:    IMAGING: Images and reports reviewed.     XR CERVICAL SPINE 2/3 VIEWS: 12/1/2023 report only no images                                                               IMPRESSION: Straightening of usual cervical lordosis without significant spondylolisthesis. No acute fracture. Scattered multilevel degenerative change most prominent at C5-C6 where there is mild disc height loss.    PHYSICAL EXAMINATION:    CONSTITUTIONAL:   No acute distress.  The patient is well nourished and well groomed.  Transitions without pushoff and moves fluidly about the room.  BMI is appropriate.  PSYCHIATRIC:  The patient is awake, alert, oriented to person, place, time and answering questions appropriately with clear speech.    SKIN:  Skin over the face, bilateral upper extremities, and posterior torso is clean, dry, intact without rashes.  MUSCULOSKELETAL:   Negative scapular dyskinesis.   Shoulder range of motion: Full and painless.  Rotator cuff strength 5/5.     Elbow wrist and hand range of motion full and painless .   Thoracic range of motion with overpressure painless   Ribs: No symptoms with respiratory excursion   Chest wall compression: Painless  Cervical range of motion full in extension and flexion.  Minimal decreased range of motion and hard endpoint and nonradiating minimal neck pain with sidebending and rotation  Spurling's maneuver:   negative  for radiating symptoms.  Negative Lhermitte's.  Negative meningismus  Palpation of upper quadrant:    Negative tenderness, spasm,  nor active trigger points radiating pain.    NEURO:   Mental status:  See Psychiatric above.  CN III-XII are grossly intact.    Gait (flat feet/heels/toes), squat/rise, normal.  Tandem walk, Romberg Pronator drift normal.  Sensation to light touch normal in torso and upper extremities .   Strength:  5/5  strength in C5-T1 myotomes, axillary/medial/radial/ulnar nerves .   Martin's/Tronder's negative     VASCULAR:   Capillary refill, temperature and color in the upper extremities is normal and symmetric.      ASSESSMENT:     Jaskaran Edwards is a 44 year old male who presents today for new patient evaluation of   Sudden onset chronic neck pain x 9 months  Chronic thoracic back pain  Nonresponse to chiropractic sessions x 20 and no evidence of somatic dysfunction cervical, thoracic, rib cage  Neurologically intact  Persistent tingling in the torso and upper extremities in a nondermatomal fashion  No myelopathic findings      DISCUSSION/PLAN:  Cervical and thoracic MRI and follow-up here to discuss afterwards.      Again, thank you for allowing me to participate in the care of your patient.        Sincerely,        Chevy Escobedo MD

## 2024-04-18 ENCOUNTER — MYC MEDICAL ADVICE (OUTPATIENT)
Dept: FAMILY MEDICINE | Facility: CLINIC | Age: 59
End: 2024-04-18
Payer: COMMERCIAL

## 2024-04-18 DIAGNOSIS — I10 BENIGN ESSENTIAL HYPERTENSION: ICD-10-CM

## 2024-04-18 RX ORDER — LISINOPRIL 10 MG/1
20 TABLET ORAL DAILY
Status: CANCELLED | OUTPATIENT
Start: 2024-04-18

## 2024-04-18 NOTE — PROGRESS NOTES
Please disregard, erroneous encounter.    Jeison Woods, WOODYN, RN, PHN  [facility] Primary [facility]

## 2024-04-18 NOTE — TELEPHONE ENCOUNTER
Patient was recommended by provider to increase dose in MyChart message on 3/15/242, now out of medication.     Jeison Woods, WOODYN, RN, PHN  [facility] Primary Care [facility]

## 2024-04-19 RX ORDER — LISINOPRIL 20 MG/1
20 TABLET ORAL DAILY
Qty: 90 TABLET | Refills: 0 | Status: SHIPPED | OUTPATIENT
Start: 2024-04-19 | End: 2024-07-15

## 2024-05-14 ENCOUNTER — ANCILLARY PROCEDURE (OUTPATIENT)
Dept: MRI IMAGING | Facility: CLINIC | Age: 59
End: 2024-05-14
Attending: PREVENTIVE MEDICINE
Payer: COMMERCIAL

## 2024-05-14 DIAGNOSIS — M54.6 CHRONIC BILATERAL THORACIC BACK PAIN: ICD-10-CM

## 2024-05-14 DIAGNOSIS — G89.29 CHRONIC BILATERAL THORACIC BACK PAIN: ICD-10-CM

## 2024-05-14 DIAGNOSIS — R20.0 NUMBNESS AND TINGLING OF UPPER EXTREMITY: ICD-10-CM

## 2024-05-14 DIAGNOSIS — M54.2 NECK PAIN: ICD-10-CM

## 2024-05-14 DIAGNOSIS — R20.2 NUMBNESS AND TINGLING OF UPPER EXTREMITY: ICD-10-CM

## 2024-05-14 PROCEDURE — 72141 MRI NECK SPINE W/O DYE: CPT | Mod: 52 | Performed by: RADIOLOGY

## 2024-05-14 RX ORDER — DIAZEPAM 5 MG
TABLET ORAL
Qty: 2 TABLET | Refills: 0 | Status: SHIPPED | OUTPATIENT
Start: 2024-05-14

## 2024-06-11 ENCOUNTER — ANCILLARY PROCEDURE (OUTPATIENT)
Dept: MRI IMAGING | Facility: CLINIC | Age: 59
End: 2024-06-11
Attending: PREVENTIVE MEDICINE
Payer: COMMERCIAL

## 2024-06-11 PROCEDURE — 72146 MRI CHEST SPINE W/O DYE: CPT | Mod: GC | Performed by: RADIOLOGY

## 2024-06-13 NOTE — PROGRESS NOTES
Subjective:    Los Salguero is a 59 year old male who presents today for follow-up regarding   Sudden onset chronic neck pain x 9 months  Chronic thoracic back pain  Nonresponse to chiropractic sessions x 20 and no evidence of somatic dysfunction cervical, thoracic, rib cage  Neurologically intact  Persistent tingling in the torso and upper extremities in a nondermatomal fashion  No myelopathic findings  Cervical and thoracic MRI and follow-up.  We had to reschedule due to claustrophobia and I called in some Valium for him on 5/14/2024.    PRIOR HISTORY from 4/17/2024:  He was seen for evaluation of neck pain self-referred.  No records in the chart.  I do note a history of MI at age 41      Chiropractic (20 sessions) and home exercise.  His primary care doctor checked his heart out and the does not appear to be formal coronary origin.     The onset was sudden about a year ago while he was playing pickle ball and he started noticing tingling in the front and back of his torso and tingling down both of his arms.  It has not responded to time and conservative care.  The pain is waking him at night several times.  There is no motor or sensory deficits anywhere including in the legs and saddle area, no bowel or bladder dysfunction, and no sexual dysfunction.  No other red flags on review of systems.  In the past years, chiropractic is usually taken his pain away but this time it did not work.  He for started having the problems he was having left arm spasms and that went away with chiropractic care.    INTERIM HISTORY:    No significant change in the quality severity location duration or timing of intermittent flares of diffuse neck upper back and arm pain in a nondermatomal fashion.  We did review all of his images and also discussed the kidney cyst and the need for follow-up.    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  He And his wife have no children.  Sports hobbies and activities: Pickleball, walking    Objective:    IMAGING:   Images and reports reviewed.    MR CERVICAL SPINE W/O CONTRAST 5/14/2024     Suboptimal exam secondary to patient motion. The cervical vertebrae  are normally aligned.  There is multilevel mild disc height narrowing.   No definite abnormal cord signal on this limited study. There are  multilevel cervical spondylosis changes which appear most significant  at C4-5. No definite high-grade spinal canal stenosis. There appear to  be varying levels of neural foraminal stenosis but there does not  appear to be any areas of severe stenosis. No abnormality of the  paraspinous soft tissues.                                                                      Impression:   1. Suboptimal exam secondary to patient motion and patient terminating  the exam prior to axial imaging.  2. Multilevel cervical spondylosis without definite high-grade spinal  canal stenosis. No definite abnormal cord signal.    MR THORACIC SPINE W/O CONTRAST 6/11/2024     Multiple presumed vertebral body hemangiomas and/or focal marrow fat  deposition at T3, T5, T8, and T9 which suppress on STIR. The thoracic  vertebral column is in normal alignment. No significant intervertebral  disc space narrowing at any level. Partially visualized mild  prominence of the central canal measuring up to 1.5 mm in diameter  extending from the lower cervical cord down to T1. No abnormal spinal  cord signal. No thoracic spinal canal or neural foraminal stenosis.     Partially visualized renal cysts. Visualized lungs are clear.                                                                      Impression:  1.  Partially visualized prominence of the central canal extending  from the partially visualized lower cervical cord down to T1 measuring  up to 1.5 mm. This was not  well visualized on the prior motion  degraded and incomplete cervical MRI from 5/14/2024.  2.  No thoracic myelopathy.  3.  No thoracic spinal canal or neural foraminal stenosis.         XR CERVICAL SPINE 2/3 VIEWS: 12/1/2023 report only no images                                                               IMPRESSION: Straightening of usual cervical lordosis without significant spondylolisthesis. No acute fracture. Scattered multilevel degenerative change most prominent at C5-C6 where there is mild disc height loss.        PHYSICAL EXAMINATION:     No formal exam today.    Assessment:    Los Salguero is a 59 year old y.o. male with   Sudden onset chronic neck pain x 9 months  Chronic thoracic back pain  Nonresponse to chiropractic sessions x 20 and no evidence of somatic dysfunction cervical, thoracic, rib cage  Neurologically intact  Persistent tingling in the torso and upper extremities in a nondermatomal fashion  No myelopathic findings    Plan:  Follow-up with PCP regarding renal cysts-Warned.  Cervical MedX program.  Reassurance.      25 minutes of time spent doing chart review, history and exam, documentation, counseling, education, coordination of care, and other activities as described above.         Please note: Voice recognition software was used in this dictation.  It may therefore contain typographical errors.    Chevy Escobedo MD

## 2024-06-20 ENCOUNTER — OFFICE VISIT (OUTPATIENT)
Dept: NEUROSURGERY | Facility: CLINIC | Age: 59
End: 2024-06-20
Payer: COMMERCIAL

## 2024-06-20 VITALS
BODY MASS INDEX: 27.49 KG/M2 | SYSTOLIC BLOOD PRESSURE: 107 MMHG | HEART RATE: 69 BPM | HEIGHT: 70 IN | DIASTOLIC BLOOD PRESSURE: 71 MMHG | WEIGHT: 192 LBS

## 2024-06-20 DIAGNOSIS — M54.6 CHRONIC BILATERAL THORACIC BACK PAIN: ICD-10-CM

## 2024-06-20 DIAGNOSIS — M79.18 MYOFASCIAL PAIN: Primary | ICD-10-CM

## 2024-06-20 DIAGNOSIS — M54.2 NECK PAIN: ICD-10-CM

## 2024-06-20 DIAGNOSIS — R29.898 MUSCULAR DECONDITIONING: ICD-10-CM

## 2024-06-20 DIAGNOSIS — G89.29 CHRONIC BILATERAL THORACIC BACK PAIN: ICD-10-CM

## 2024-06-20 PROCEDURE — 99214 OFFICE O/P EST MOD 30 MIN: CPT | Performed by: PREVENTIVE MEDICINE

## 2024-06-20 ASSESSMENT — PAIN SCALES - GENERAL: PAINLEVEL: NO PAIN (0)

## 2024-06-20 NOTE — PATIENT INSTRUCTIONS
Los I am happy to tell you that I do not see bad disease in either your neck or your thoracic spine that explains the pains that you have been having.  I think the best that we can recommend is an aggressive MedX strengthening program which will make you more sore when you start in the program last for 10 to 12 weeks but I think it is a good option that we will give you a better quality of life.  Hopefully it would decrease the severity and frequency of your flareups.  I do not recommend any surgery or injections of the neck at this time.

## 2024-06-20 NOTE — LETTER
6/20/2024      Los Salguero  [address]  [place] [address]      Dear Colleague,    Thank you for referring your patient, Los Salguero, to the [facility] NEUROSURGERY CLINIC [place]. Please see a copy of my visit note below.        Subjective:    Los Salguero is a 59 year old male who presents today for follow-up regarding   Sudden onset chronic neck pain x 9 months  Chronic thoracic back pain  Nonresponse to chiropractic sessions x 20 and no evidence of somatic dysfunction cervical, thoracic, rib cage  Neurologically intact  Persistent tingling in the torso and upper extremities in a nondermatomal fashion  No myelopathic findings  Cervical and thoracic MRI and follow-up.  We had to reschedule due to claustrophobia and I called in some Valium for him on 5/14/2024.    PRIOR HISTORY from 4/17/2024:  He was seen for evaluation of neck pain self-referred.  No records in the chart.  I do note a history of MI at age 41      Chiropractic (20 sessions) and home exercise.  His primary care doctor checked his heart out and the does not appear to be formal coronary origin.     The onset was sudden about a year ago while he was playing pickle ball and he started noticing tingling in the front and back of his torso and tingling down both of his arms.  It has not responded to time and conservative care.  The pain is waking him at night several times.  There is no motor or sensory deficits anywhere including in the legs and saddle area, no bowel or bladder dysfunction, and no sexual dysfunction.  No other red flags on review of systems.  In the past years, chiropractic is usually taken his pain away but this time it did not work.  He for started having the problems he was having left arm spasms and that went away with chiropractic care.    INTERIM HISTORY:    No significant change in the quality severity location duration or timing of intermittent flares of diffuse neck upper back and arm pain in a  nondermatomal fashion.  We did review all of his images and also discussed the kidney cyst and the need for follow-up.    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  He And his wife have no children.  Sports hobbies and activities: Pickleball, walking    Objective:    IMAGING:   Images and reports reviewed.    MR CERVICAL SPINE W/O CONTRAST 5/14/2024     Suboptimal exam secondary to patient motion. The cervical vertebrae  are normally aligned.  There is multilevel mild disc height narrowing.   No definite abnormal cord signal on this limited study. There are  multilevel cervical spondylosis changes which appear most significant  at C4-5. No definite high-grade spinal canal stenosis. There appear to  be varying levels of neural foraminal stenosis but there does not  appear to be any areas of severe stenosis. No abnormality of the  paraspinous soft tissues.                                                                      Impression:   1. Suboptimal exam secondary to patient motion and patient terminating  the exam prior to axial imaging.  2. Multilevel cervical spondylosis without definite high-grade spinal  canal stenosis. No definite abnormal cord signal.    MR THORACIC SPINE W/O CONTRAST 6/11/2024     Multiple presumed vertebral body hemangiomas and/or focal marrow fat  deposition at T3, T5, T8, and T9 which suppress on STIR. The thoracic  vertebral column is in normal alignment. No significant intervertebral  disc space narrowing at any level. Partially visualized mild  prominence of the central canal measuring up to 1.5 mm in diameter  extending from the lower cervical cord down to T1. No abnormal spinal  cord signal. No thoracic spinal canal or neural foraminal stenosis.     Partially visualized renal cysts. Visualized lungs are  clear.                                                                      Impression:  1.  Partially visualized prominence of the central canal extending  from the partially visualized lower cervical cord down to T1 measuring  up to 1.5 mm. This was not well visualized on the prior motion  degraded and incomplete cervical MRI from 5/14/2024.  2.  No thoracic myelopathy.  3.  No thoracic spinal canal or neural foraminal stenosis.         XR CERVICAL SPINE 2/3 VIEWS: 12/1/2023 report only no images                                                               IMPRESSION: Straightening of usual cervical lordosis without significant spondylolisthesis. No acute fracture. Scattered multilevel degenerative change most prominent at C5-C6 where there is mild disc height loss.        PHYSICAL EXAMINATION:     No formal exam today.    Assessment:    Los Salguero is a 59 year old y.o. male with   Sudden onset chronic neck pain x 9 months  Chronic thoracic back pain  Nonresponse to chiropractic sessions x 20 and no evidence of somatic dysfunction cervical, thoracic, rib cage  Neurologically intact  Persistent tingling in the torso and upper extremities in a nondermatomal fashion  No myelopathic findings    Plan:  Follow-up with PCP regarding renal cysts-Warned.  Cervical MedX program.  Reassurance.      25 minutes of time spent doing chart review, history and exam, documentation, counseling, education, coordination of care, and other activities as described above.         Please note: Voice recognition software was used in this dictation.  It may therefore contain typographical errors.    Chevy Escobedo MD           Again, thank you for allowing me to participate in the care of your patient.        Sincerely,        Chevy Escobedo MD

## 2024-06-20 NOTE — NURSING NOTE
"Reason For Visit:   Chief Complaint   Patient presents with    RECHECK     Follow-up MRI       Occupation: sales [employer]  Currently working? Yes.  Work status?  Full time.     Sports: pickleball   Activities: lifting            /71   Pulse 69   Ht 1.778 m (5' 10\")   Wt 87.1 kg (192 lb)   BMI 27.55 kg/m        No Known Allergies    Current Outpatient Medications   Medication Sig Dispense Refill    ASPIRIN ADULT LOW STRENGTH PO Take  by mouth.      carboxymethylcellulose (CARBOXYMETHYLCELLULOSE SODIUM) 0.5 % SOLN ophthalmic solution Place 1 drop into both eyes 4 times daily 15 mL 11    carboxymethylcellulose PF (REFRESH LIQUIGEL) 1 % ophthalmic gel Place 1 drop into both eyes At Bedtime 30 each 11    diazepam (VALIUM) 5 MG tablet Take one pill 60 minutes before the MRI, and you may take the second pill as needed for anxiety.  Make sure you have a  to and from the facility. 2 tablet 0    lisinopril (ZESTRIL) 20 MG tablet Take 1 tablet (20 mg) by mouth daily For blood pressure 90 tablet 0    LISINOPRIL PO Take  by mouth.      omeprazole (PRILOSEC) 20 MG DR capsule TAKE 1 CAPSULE(20 MG) BY MOUTH DAILY FOR HEARTBURN 90 capsule 3    sildenafil (VIAGRA) 100 MG tablet TAKE ONE TABLET BY MOUTH DAILY AS NEEDED FOR ERECTILE DYSFUNCTION. TAKE 30 MINUTES TO 4 HOURS BEFORE SEXUAL ACTIVITY. MAX 100MG PER 24 HOURS 30 tablet 4    simvastatin (ZOCOR) 40 MG tablet Take 1 tablet (40 mg) by mouth at bedtime For cholesterol 90 tablet 3     No current facility-administered medications for this visit.         Darla Severin-Brown, ANGEL   "

## 2024-06-26 ENCOUNTER — VIRTUAL VISIT (OUTPATIENT)
Dept: FAMILY MEDICINE | Facility: CLINIC | Age: 59
End: 2024-06-26
Payer: COMMERCIAL

## 2024-06-26 DIAGNOSIS — I10 BENIGN ESSENTIAL HYPERTENSION: ICD-10-CM

## 2024-06-26 DIAGNOSIS — N28.1 KIDNEY CYSTS: Primary | ICD-10-CM

## 2024-06-26 PROCEDURE — 99213 OFFICE O/P EST LOW 20 MIN: CPT | Mod: 95 | Performed by: PREVENTIVE MEDICINE

## 2024-06-26 NOTE — PROGRESS NOTES
"Los is a 59 year old who is being evaluated via a billable video visit.    How would you like to obtain your AVS? [facility]  If the video visit is dropped, the invitation should be resent by: Text to cell phone: [phone]  Will anyone else be joining your video visit? No        Instructions Relayed to Patient by Virtual Roomer:       If pediatric virtual visit, ensured pediatric patient along with parent/guardian will be present for video visit.     Patient offered the website [email or web address] and/or phone number to [employer] Help line: [phone]   Assessment & Plan     Kidney cysts  -Cysts were partially visualized on MRI of the thoracic spine  -No prior history of kidney disease  -No family history of kidney disease  -Renal function normal March 23  -Check ultrasound of the kidneys for further evaluation  - US Renal Complete Non-Vascular    Benign essential hypertension  -Blood pressure is at goal at current medication dosages            BMI  Estimated body mass index is 27.55 kg/m  as calculated from the following:    Height as of 6/20/24: 1.778 m (5' 10\").    Weight as of 6/20/24: 87.1 kg (192 lb).       Subjective   Los is a 59 year old, presenting for the following health issues:  Follow Up      6/26/2024    12:49 PM   Additional Questions   Roomed by rosy   Accompanied by self     Via the Health Maintenance questionnaire, the patient has reported the following services have been completed -Colonscopy: [facility] 2022-06-01, this information has been sent to the abstraction team.  History of Present Illness       Reason for visit:  Cysts on my kidneys  Symptoms include:  No symptons,  Dr Escobedo said they were pretty big and thought I let you know and to order test?  Symptom intensity:  Mild  Symptom progression:  Staying the same  Had these symptoms before:  No  What makes it worse:  N/a  What makes it better:  N/A    He eats 2-3 servings of fruits and vegetables daily.He " consumes 0 sweetened beverage(s) daily.He exercises with enough effort to increase his heart rate 9 or less minutes per day.  He exercises with enough effort to increase his heart rate 3 or less days per week.   He is taking medications regularly.     No family history of renal disease  No hematuria  No urinary symptoms, no history of kidney stones  Kidney function and urine microalbumin normal 3/15/24    Cyst on jaw line in the past    Patient stated he would like to follow up in regards to recent MRI completed, He also stated that he has been having issues with hearing his heart beat in his ears; he stated he has a doctors appointment with an ENT next week but wanted to discuss this as well.  He did not state that it felt like heart palpitations- patient just stated he could hear his own heart beat constantly   ENT appointment 8/1/24.   Also has Audiology appointment  More on the left ear  No ear drainage  No hearing loss  No tinnitus   No history of personal or family history       Blood pressure is much better  No dizziness   Walking 5 miles a day        Objective           Vitals:  No vitals were obtained today due to virtual visit.    Physical Exam   GENERAL: alert and no distress  EYES: Eyes grossly normal to inspection.  No discharge or erythema, or obvious scleral/conjunctival abnormalities.  RESP: No audible wheeze, cough, or visible cyanosis.    SKIN: Visible skin clear. No significant rash, abnormal pigmentation or lesions.  NEURO: Cranial nerves grossly intact.  Mentation and speech appropriate for age.  PSYCH: Appropriate affect, tone, and pace of words    No results found for this or any previous visit (from the past 24 hour(s)).      Video-Visit Details    Type of service:  Video Visit   Originating Location (pt. Location): Home    Distant Location (provider location):  On-site  Platform used for Video Visit: Christina  Signed Electronically by: Maris Basurto MD MPH

## 2024-06-27 ENCOUNTER — TRANSFERRED RECORDS (OUTPATIENT)
Dept: MULTI SPECIALTY CLINIC | Facility: CLINIC | Age: 59
End: 2024-06-27
Payer: COMMERCIAL

## 2024-06-28 ENCOUNTER — ANCILLARY PROCEDURE (OUTPATIENT)
Dept: ULTRASOUND IMAGING | Facility: CLINIC | Age: 59
End: 2024-06-28
Attending: PREVENTIVE MEDICINE
Payer: COMMERCIAL

## 2024-06-28 DIAGNOSIS — N28.1 KIDNEY CYSTS: ICD-10-CM

## 2024-06-28 PROCEDURE — 76770 US EXAM ABDO BACK WALL COMP: CPT | Performed by: STUDENT IN AN ORGANIZED HEALTH CARE EDUCATION/TRAINING PROGRAM

## 2024-07-04 NOTE — RESULT ENCOUNTER NOTE
Los,     Ultrasound is showing Simple kidney cysts.  Since these are not complex cysts, no further work up indicated at this time.  If you develop any new flank pain, blood in the urine, please let me know.     Please do not hesitate to call us at [phone] if you have any questions or concerns.    Thank you,    Maris Basurto MD MPH

## 2024-07-15 DIAGNOSIS — I10 BENIGN ESSENTIAL HYPERTENSION: ICD-10-CM

## 2024-07-15 RX ORDER — LISINOPRIL 20 MG/1
TABLET ORAL
Qty: 90 TABLET | Refills: 0 | Status: SHIPPED | OUTPATIENT
Start: 2024-07-15

## 2024-07-23 NOTE — PROGRESS NOTES
"ENT Consultation    Los Salguero who is a 59 year old male seen in consultation at the request of self.      History of Present Illness - Los Salguero is a 59 year old male Consult  Patient presents with a pulsatile tinnitus hearing his \"heartbeat\" in the left ear for the last several months.  Denies any dizziness vertigo any headaches any vision changes.  Denies history of migraines.  He is aware of some hearing issues especially with a background noise environment.  Denies history of ear infections.  He does have a cervical disc issues and had a recent MRI.  Did not have any MRIs of the brain.  Patient has hypertension and is managed but does get some orthostatic changes from time to time.  MR CERVICAL SPINE W/O CONTRAST 5/14/2024 10:18 AM     Provided History: Diffuse tingling in torso and both upper extremities  x 9 months.; Neck pain; Chronic bilateral thoracic back pain; Chronic  bilateral thoracic back pain; Numbness and tingling of upper  extremity; Numbness and tingling of upper extremity  ICD-10: Neck pain; Chronic bilateral thoracic back pain; Chronic  bilateral thoracic back pain; Numbness and tingling of upper  extremity; Numbness and tingling of upper extremity     Comparison: None     Technique: Sagittal T1 and T2 weighted, and STIR just images of the  cervical spine without contrast. Patient terminated the exam prior to  axial imaging.      Findings:  Suboptimal exam secondary to patient motion. The cervical vertebrae  are normally aligned.  There is multilevel mild disc height narrowing.   No definite abnormal cord signal on this limited study. There are  multilevel cervical spondylosis changes which appear most significant  at C4-5. No definite high-grade spinal canal stenosis. There appear to  be varying levels of neural foraminal stenosis but there does not  appear to be any areas of severe stenosis. No abnormality of the  paraspinous soft tissues.                                                "                       Impression:   1. Suboptimal exam secondary to patient motion and patient terminating  the exam prior to axial imaging.  2. Multilevel cervical spondylosis without definite high-grade spinal  canal stenosis. No definite abnormal cord signal.     I have personally reviewed the examination and initial interpretation  and I agree with the findings.     Body mass index is 27.55 kg/m .    Weight management plan: Patient was referred to their PCP to discuss a diet and exercise plan.    BP Readings from Last 1 Encounters:   08/01/24 122/80       BP noted to be well controlled today in office.       Past Medical History -   Past Medical History:   Diagnosis Date    Coronary artery disease     MI age 41; 1 coronary stent placed    Hyperlipidemia     Hypertension        Current Medications -   Current Outpatient Medications:     ASPIRIN ADULT LOW STRENGTH PO, Take  by mouth., Disp: , Rfl:     lisinopril (ZESTRIL) 20 MG tablet, TAKE 1 TABLET(20 MG) BY MOUTH DAILY FOR BLOOD PRESSURE, Disp: 90 tablet, Rfl: 0    sildenafil (VIAGRA) 100 MG tablet, TAKE ONE TABLET BY MOUTH DAILY AS NEEDED FOR ERECTILE DYSFUNCTION. TAKE 30 MINUTES TO 4 HOURS BEFORE SEXUAL ACTIVITY. MAX 100MG PER 24 HOURS, Disp: 30 tablet, Rfl: 4    simvastatin (ZOCOR) 40 MG tablet, Take 1 tablet (40 mg) by mouth at bedtime For cholesterol, Disp: 90 tablet, Rfl: 3    carboxymethylcellulose (CARBOXYMETHYLCELLULOSE SODIUM) 0.5 % SOLN ophthalmic solution, Place 1 drop into both eyes 4 times daily (Patient not taking: Reported on 8/1/2024), Disp: 15 mL, Rfl: 11    carboxymethylcellulose PF (REFRESH LIQUIGEL) 1 % ophthalmic gel, Place 1 drop into both eyes At Bedtime (Patient not taking: Reported on 8/1/2024), Disp: 30 each, Rfl: 11    diazepam (VALIUM) 5 MG tablet, Take one pill 60 minutes before the MRI, and you may take the second pill as needed for anxiety.  Make sure you have a  to and from the facility. (Patient not taking: Reported on  "2024), Disp: 2 tablet, Rfl: 0    LISINOPRIL PO, Take  by mouth. (Patient not taking: Reported on 2024), Disp: , Rfl:     omeprazole (PRILOSEC) 20 MG DR capsule, TAKE 1 CAPSULE(20 MG) BY MOUTH DAILY FOR HEARTBURN (Patient not taking: Reported on 2024), Disp: 90 capsule, Rfl: 3    Allergies - No Known Allergies    Social History -   Social History     Socioeconomic History    Marital status:    Tobacco Use    Smoking status: Former     Current packs/day: 0.00     Average packs/day: 1.5 packs/day for 15.0 years (22.5 ttl pk-yrs)     Types: Cigarettes     Start date: 1975     Quit date: 1990     Years since quittin.9    Smokeless tobacco: Never   Vaping Use    Vaping status: Never Used   Substance and Sexual Activity    Alcohol use: Yes     Comment: 6-10 drinks weekly or every other week    Drug use: No    Sexual activity: Yes     Partners: Female     Birth control/protection: None     Social Determinants of Health      Received from [facility] [employer] [facility] [employer]    Financial Resource Strain    Received from [employer] [employer] [facility] [employer]    Social Connections       Family History -   Family History   Problem Relation Age of Onset    C.A.D. Father     C.A.D. Paternal Grandmother     Cancer Mother         Small Cell Lung       Review of Systems - As per HPI and PMHx, otherwise review of system review of the head and neck negative. Otherwise 10+ review of system is negative    Physical Exam  /80 (BP Location: Right arm, Cuff Size: Adult Regular)   Temp 97.5  F (36.4  C) (Temporal)   Ht 1.778 m (5' 10\")   Wt 87.1 kg (192 lb)   BMI 27.55 kg/m    BMI: Body mass index is 27.55 kg/m .    General - The patient is well nourished and well developed, and appears to have good nutritional status.  Alert and oriented to person and place, answers questions and cooperates " with examination appropriately.    SKIN - No suspicious lesions or rashes.  Respiration - No respiratory distress.  Head and Face - Normocephalic and atraumatic, with no gross asymmetry noted of the contour of the facial features.  The facial nerve is intact, with strong symmetric movements.    Voice and Breathing - The patient was breathing comfortably without the use of accessory muscles. The patients voice was clear and strong, and had appropriate pitch and quality.    Ears - Bilateral pinna and EACs with normal appearing overlying skin. Tympanic membrane intact with good mobility on pneumatic otoscopy bilaterally. Bony landmarks of the ossicular chain are normal. The tympanic membranes are normal in appearance. No retraction, perforation, or masses.  No fluid or purulence was seen in the external canal or the middle ear.     Eyes - Extraocular movements intact.  Sclera were not icteric or injected, conjunctiva were pink and moist.    Mouth - Examination of the oral cavity showed pink, healthy oral mucosa. No lesions or ulcerations noted.  The tongue was mobile and midline, and the dentition were in good condition.      Throat - The walls of the oropharynx were smooth, pink, moist, symmetric, and had no lesions or ulcerations.  The tonsillar pillars and soft palate were symmetric.  The uvula was midline on elevation.    Neck - Normal midline excursion of the laryngotracheal complex during swallowing.  Full range of motion on passive movement.  Palpation of the occipital, submental, submandibular, internal jugular chain, and supraclavicular nodes did not demonstrate any abnormal lymph nodes or masses.  The carotid pulse was palpable bilaterally.  Palpation of the thyroid was soft and smooth, with no nodules or goiter appreciated.  The trachea was mobile and midline.    Nose - External contour is symmetric, no gross deflection or scars.  Nasal mucosa is pink and moist with no abnormal mucus.  The septum was midline  and non-obstructive, turbinates of normal size and position.  No polyps, masses, or purulence noted on examination.    Neuro - Nonfocal neuro exam is normal, CN 2 through 12 intact, normal gait and muscle tone.      Performed in clinic today:  PatientAudiologic Studies - An audiogram and tympanogram were performed today as part of the evaluation and personally reviewed. The tympanogram shows Type A curves on the right and Type A curves on the left, with normal canal volumes and middle ear pressures.  The audiogram showed mild high-frequency SNHL on the right and mild high-frequency SNHL on the left.      Excellent word recognition bilaterally.  A/P - Los Salguero is a 59 year old male patient with a unilateral pulsatile tinnitus.  Needs further evaluation.  In the meantime we instructed him to cut down caffeine dark chocolate and salt consumption.  Will get MRI of the brain as well as MR angiography of his neck considering cervical spinal stenosis potential vascular compromise as well as MR angiography of the brain.  Patient will follow-up in a couple months.      Sanford Hopper MD

## 2024-08-01 ENCOUNTER — OFFICE VISIT (OUTPATIENT)
Dept: AUDIOLOGY | Facility: CLINIC | Age: 59
End: 2024-08-01
Payer: COMMERCIAL

## 2024-08-01 ENCOUNTER — OFFICE VISIT (OUTPATIENT)
Dept: OTOLARYNGOLOGY | Facility: CLINIC | Age: 59
End: 2024-08-01
Payer: COMMERCIAL

## 2024-08-01 VITALS
TEMPERATURE: 97.5 F | SYSTOLIC BLOOD PRESSURE: 122 MMHG | HEIGHT: 70 IN | DIASTOLIC BLOOD PRESSURE: 80 MMHG | WEIGHT: 192 LBS | BODY MASS INDEX: 27.49 KG/M2

## 2024-08-01 DIAGNOSIS — H93.A2 PULSATILE TINNITUS OF LEFT EAR: Primary | ICD-10-CM

## 2024-08-01 DIAGNOSIS — F41.9 ANXIETY: ICD-10-CM

## 2024-08-01 DIAGNOSIS — H90.3 SENSORINEURAL HEARING LOSS, BILATERAL: Primary | ICD-10-CM

## 2024-08-01 DIAGNOSIS — H93.A2 PULSATILE TINNITUS OF LEFT EAR: ICD-10-CM

## 2024-08-01 PROCEDURE — 92550 TYMPANOMETRY & REFLEX THRESH: CPT | Performed by: AUDIOLOGIST

## 2024-08-01 PROCEDURE — 99203 OFFICE O/P NEW LOW 30 MIN: CPT | Performed by: OTOLARYNGOLOGY

## 2024-08-01 PROCEDURE — 92557 COMPREHENSIVE HEARING TEST: CPT | Performed by: AUDIOLOGIST

## 2024-08-01 RX ORDER — LORAZEPAM 0.5 MG/1
0.5 TABLET ORAL ONCE
Qty: 1 TABLET | Refills: 0 | Status: SHIPPED | OUTPATIENT
Start: 2024-08-01 | End: 2024-08-02

## 2024-08-01 ASSESSMENT — PAIN SCALES - GENERAL: PAINLEVEL: NO PAIN (0)

## 2024-08-01 NOTE — LETTER
"8/1/2024      Los Salguero  [address]  [place] [facility] [place] [address]      Dear Colleague,    Thank you for referring your patient, Los Salguero, to the [facility]. Please see a copy of my visit note below.    ENT Consultation    Los Salguero who is a 59 year old male seen in consultation at the request of self.      History of Present Illness - Los Salguero is a 59 year old male Consult  Patient presents with a pulsatile tinnitus hearing his \"heartbeat\" in the left ear for the last several months.  Denies any dizziness vertigo any headaches any vision changes.  Denies history of migraines.  He is aware of some hearing issues especially with a background noise environment.  Denies history of ear infections.  He does have a cervical disc issues and had a recent MRI.  Did not have any MRIs of the brain.  Patient has hypertension and is managed but does get some orthostatic changes from time to time.  MR CERVICAL SPINE W/O CONTRAST 5/14/2024 10:18 AM     Provided History: Diffuse tingling in torso and both upper extremities  x 9 months.; Neck pain; Chronic bilateral thoracic back pain; Chronic  bilateral thoracic back pain; Numbness and tingling of upper  extremity; Numbness and tingling of upper extremity  ICD-10: Neck pain; Chronic bilateral thoracic back pain; Chronic  bilateral thoracic back pain; Numbness and tingling of upper  extremity; Numbness and tingling of upper extremity     Comparison: None     Technique: Sagittal T1 and T2 weighted, and STIR just images of the  cervical spine without contrast. Patient terminated the exam prior to  axial imaging.      Findings:  Suboptimal exam secondary to patient motion. The cervical vertebrae  are normally aligned.  There is multilevel mild disc height narrowing.   No definite abnormal cord signal on this limited study. There are  multilevel cervical spondylosis changes which appear most significant  at C4-5. No definite high-grade spinal " canal stenosis. There appear to  be varying levels of neural foraminal stenosis but there does not  appear to be any areas of severe stenosis. No abnormality of the  paraspinous soft tissues.                                                                      Impression:   1. Suboptimal exam secondary to patient motion and patient terminating  the exam prior to axial imaging.  2. Multilevel cervical spondylosis without definite high-grade spinal  canal stenosis. No definite abnormal cord signal.     I have personally reviewed the examination and initial interpretation  and I agree with the findings.     Body mass index is 27.55 kg/m .    Weight management plan: Patient was referred to their PCP to discuss a diet and exercise plan.    BP Readings from Last 1 Encounters:   08/01/24 122/80       BP noted to be well controlled today in office.       Past Medical History -   Past Medical History:   Diagnosis Date     Coronary artery disease     MI age 41; 1 coronary stent placed     Hyperlipidemia      Hypertension        Current Medications -   Current Outpatient Medications:      ASPIRIN ADULT LOW STRENGTH PO, Take  by mouth., Disp: , Rfl:      lisinopril (ZESTRIL) 20 MG tablet, TAKE 1 TABLET(20 MG) BY MOUTH DAILY FOR BLOOD PRESSURE, Disp: 90 tablet, Rfl: 0     sildenafil (VIAGRA) 100 MG tablet, TAKE ONE TABLET BY MOUTH DAILY AS NEEDED FOR ERECTILE DYSFUNCTION. TAKE 30 MINUTES TO 4 HOURS BEFORE SEXUAL ACTIVITY. MAX 100MG PER 24 HOURS, Disp: 30 tablet, Rfl: 4     simvastatin (ZOCOR) 40 MG tablet, Take 1 tablet (40 mg) by mouth at bedtime For cholesterol, Disp: 90 tablet, Rfl: 3     carboxymethylcellulose (CARBOXYMETHYLCELLULOSE SODIUM) 0.5 % SOLN ophthalmic solution, Place 1 drop into both eyes 4 times daily (Patient not taking: Reported on 8/1/2024), Disp: 15 mL, Rfl: 11     carboxymethylcellulose PF (REFRESH LIQUIGEL) 1 % ophthalmic gel, Place 1 drop into both eyes At Bedtime (Patient not taking: Reported on  "2024), Disp: 30 each, Rfl: 11     diazepam (VALIUM) 5 MG tablet, Take one pill 60 minutes before the MRI, and you may take the second pill as needed for anxiety.  Make sure you have a  to and from the facility. (Patient not taking: Reported on 2024), Disp: 2 tablet, Rfl: 0     LISINOPRIL PO, Take  by mouth. (Patient not taking: Reported on 2024), Disp: , Rfl:      omeprazole (PRILOSEC) 20 MG DR capsule, TAKE 1 CAPSULE(20 MG) BY MOUTH DAILY FOR HEARTBURN (Patient not taking: Reported on 2024), Disp: 90 capsule, Rfl: 3    Allergies - No Known Allergies    Social History -   Social History     Socioeconomic History     Marital status:    Tobacco Use     Smoking status: Former     Current packs/day: 0.00     Average packs/day: 1.5 packs/day for 15.0 years (22.5 ttl pk-yrs)     Types: Cigarettes     Start date: 1975     Quit date: 1990     Years since quittin.9     Smokeless tobacco: Never   Vaping Use     Vaping status: Never Used   Substance and Sexual Activity     Alcohol use: Yes     Comment: 6-10 drinks weekly or every other week     Drug use: No     Sexual activity: Yes     Partners: Female     Birth control/protection: None     Social Determinants of Health      [facility] [employer] [facility]    Financial Resource Strain    [facility] [employer] [employer] [facility]    Social Connections       Family History -   Family History   Problem Relation Age of Onset     C.A.D. Father      C.A.D. Paternal Grandmother      Cancer Mother         Small Cell Lung       Review of Systems - As per HPI and PMHx, otherwise review of system review of the head and neck negative. Otherwise 10+ review of system is negative    Physical Exam  /80 (BP Location: Right arm, Cuff Size: Adult Regular)   Temp 97.5  F (36.4  C) (Temporal)   Ht 1.778 m (5' 10\")  "  Wt 87.1 kg (192 lb)   BMI 27.55 kg/m    BMI: Body mass index is 27.55 kg/m .    General - The patient is well nourished and well developed, and appears to have good nutritional status.  Alert and oriented to person and place, answers questions and cooperates with examination appropriately.    SKIN - No suspicious lesions or rashes.  Respiration - No respiratory distress.  Head and Face - Normocephalic and atraumatic, with no gross asymmetry noted of the contour of the facial features.  The facial nerve is intact, with strong symmetric movements.    Voice and Breathing - The patient was breathing comfortably without the use of accessory muscles. The patients voice was clear and strong, and had appropriate pitch and quality.    Ears - Bilateral pinna and EACs with normal appearing overlying skin. Tympanic membrane intact with good mobility on pneumatic otoscopy bilaterally. Bony landmarks of the ossicular chain are normal. The tympanic membranes are normal in appearance. No retraction, perforation, or masses.  No fluid or purulence was seen in the external canal or the middle ear.     Eyes - Extraocular movements intact.  Sclera were not icteric or injected, conjunctiva were pink and moist.    Mouth - Examination of the oral cavity showed pink, healthy oral mucosa. No lesions or ulcerations noted.  The tongue was mobile and midline, and the dentition were in good condition.      Throat - The walls of the oropharynx were smooth, pink, moist, symmetric, and had no lesions or ulcerations.  The tonsillar pillars and soft palate were symmetric.  The uvula was midline on elevation.    Neck - Normal midline excursion of the laryngotracheal complex during swallowing.  Full range of motion on passive movement.  Palpation of the occipital, submental, submandibular, internal jugular chain, and supraclavicular nodes did not demonstrate any abnormal lymph nodes or masses.  The carotid pulse was palpable bilaterally.  Palpation  of the thyroid was soft and smooth, with no nodules or goiter appreciated.  The trachea was mobile and midline.    Nose - External contour is symmetric, no gross deflection or scars.  Nasal mucosa is pink and moist with no abnormal mucus.  The septum was midline and non-obstructive, turbinates of normal size and position.  No polyps, masses, or purulence noted on examination.    Neuro - Nonfocal neuro exam is normal, CN 2 through 12 intact, normal gait and muscle tone.      Performed in clinic today:  PatientAudiologic Studies - An audiogram and tympanogram were performed today as part of the evaluation and personally reviewed. The tympanogram shows Type A curves on the right and Type A curves on the left, with normal canal volumes and middle ear pressures.  The audiogram showed mild high-frequency SNHL on the right and mild high-frequency SNHL on the left.      Excellent word recognition bilaterally.  A/P - Los Salguero is a 59 year old male patient with a unilateral pulsatile tinnitus.  Needs further evaluation.  In the meantime we instructed him to cut down caffeine dark chocolate and salt consumption.  Will get MRI of the brain as well as MR angiography of his neck considering cervical spinal stenosis potential vascular compromise as well as MR angiography of the brain.  Patient will follow-up in a couple months.      Sanford Hopper MD       Again, thank you for allowing me to participate in the care of your patient.        Sincerely,        Sanford Hopper MD, MD

## 2024-08-01 NOTE — PROGRESS NOTES
AUDIOLOGY REPORT     SUMMARY: Audiology visit completed. See audiogram for results.     RECOMMENDATIONS: Follow-up with ENT    [place] Licensed Audiologist #[id number]

## 2024-08-02 ENCOUNTER — MYC MEDICAL ADVICE (OUTPATIENT)
Dept: OTOLARYNGOLOGY | Facility: CLINIC | Age: 59
End: 2024-08-02
Payer: COMMERCIAL

## 2024-08-02 DIAGNOSIS — F41.9 ANXIETY: ICD-10-CM

## 2024-08-02 RX ORDER — LORAZEPAM 0.5 MG/1
0.5 TABLET ORAL ONCE
Qty: 2 TABLET | Refills: 0 | Status: SHIPPED | OUTPATIENT
Start: 2024-08-02 | End: 2024-08-02

## 2024-08-15 ENCOUNTER — MYC MEDICAL ADVICE (OUTPATIENT)
Dept: FAMILY MEDICINE | Facility: CLINIC | Age: 59
End: 2024-08-15
Payer: COMMERCIAL

## 2024-08-16 ENCOUNTER — HOSPITAL ENCOUNTER (OUTPATIENT)
Dept: MRI IMAGING | Facility: CLINIC | Age: 59
Discharge: HOME | End: 2024-08-16
Attending: OTOLARYNGOLOGY | Admitting: OTOLARYNGOLOGY
Payer: COMMERCIAL

## 2024-08-16 ENCOUNTER — TELEPHONE (OUTPATIENT)
Dept: OTOLARYNGOLOGY | Facility: CLINIC | Age: 59
End: 2024-08-16
Payer: COMMERCIAL

## 2024-08-16 DIAGNOSIS — F40.240 CLAUSTROPHOBIA: Primary | ICD-10-CM

## 2024-08-16 DIAGNOSIS — H93.A2 PULSATILE TINNITUS OF LEFT EAR: ICD-10-CM

## 2024-08-16 PROCEDURE — 70553 MRI BRAIN STEM W/O & W/DYE: CPT

## 2024-08-16 PROCEDURE — 255N000002 HC RX 255 OP 636: Performed by: OTOLARYNGOLOGY

## 2024-08-16 PROCEDURE — 70549 MR ANGIOGRAPH NECK W/O&W/DYE: CPT

## 2024-08-16 PROCEDURE — A9585 GADOBUTROL INJECTION: HCPCS | Performed by: OTOLARYNGOLOGY

## 2024-08-16 PROCEDURE — 70544 MR ANGIOGRAPHY HEAD W/O DYE: CPT

## 2024-08-16 RX ORDER — GADOBUTROL 604.72 MG/ML
10 INJECTION INTRAVENOUS ONCE
Status: COMPLETED | OUTPATIENT
Start: 2024-08-16 | End: 2024-08-16

## 2024-08-16 RX ORDER — DIAZEPAM 5 MG
TABLET ORAL
Qty: 2 TABLET | Refills: 0 | Status: SHIPPED | OUTPATIENT
Start: 2024-08-16

## 2024-08-16 RX ADMIN — GADOBUTROL 10 ML: 604.72 INJECTION INTRAVENOUS at 10:35

## 2024-08-16 NOTE — TELEPHONE ENCOUNTER
Patient is having a MRA done today. He is claustrophobic. He has used Valium in the past and is requesting this medication prior to the examination. Medication was sent to requested pharmacy.     MAURY Black CNP  Otolaryngology  [facility]

## 2024-09-05 ENCOUNTER — VIRTUAL VISIT (OUTPATIENT)
Dept: FAMILY MEDICINE | Facility: CLINIC | Age: 59
End: 2024-09-05
Payer: COMMERCIAL

## 2024-09-05 DIAGNOSIS — F41.1 GAD (GENERALIZED ANXIETY DISORDER): Primary | ICD-10-CM

## 2024-09-05 PROCEDURE — 99214 OFFICE O/P EST MOD 30 MIN: CPT | Mod: 95 | Performed by: PREVENTIVE MEDICINE

## 2024-09-05 PROCEDURE — 96127 BRIEF EMOTIONAL/BEHAV ASSMT: CPT | Mod: 95 | Performed by: PREVENTIVE MEDICINE

## 2024-09-05 PROCEDURE — G2211 COMPLEX E/M VISIT ADD ON: HCPCS | Mod: 95 | Performed by: PREVENTIVE MEDICINE

## 2024-09-05 RX ORDER — ESCITALOPRAM OXALATE 10 MG/1
10 TABLET ORAL DAILY
Qty: 90 TABLET | Refills: 1 | Status: SHIPPED | OUTPATIENT
Start: 2024-09-05

## 2024-09-05 ASSESSMENT — ANXIETY QUESTIONNAIRES
GAD7 TOTAL SCORE: 18
5. BEING SO RESTLESS THAT IT IS HARD TO SIT STILL: NEARLY EVERY DAY
IF YOU CHECKED OFF ANY PROBLEMS ON THIS QUESTIONNAIRE, HOW DIFFICULT HAVE THESE PROBLEMS MADE IT FOR YOU TO DO YOUR WORK, TAKE CARE OF THINGS AT HOME, OR GET ALONG WITH OTHER PEOPLE: SOMEWHAT DIFFICULT
6. BECOMING EASILY ANNOYED OR IRRITABLE: NEARLY EVERY DAY
7. FEELING AFRAID AS IF SOMETHING AWFUL MIGHT HAPPEN: NOT AT ALL
3. WORRYING TOO MUCH ABOUT DIFFERENT THINGS: NEARLY EVERY DAY
2. NOT BEING ABLE TO STOP OR CONTROL WORRYING: NEARLY EVERY DAY
1. FEELING NERVOUS, ANXIOUS, OR ON EDGE: NEARLY EVERY DAY
GAD7 TOTAL SCORE: 18

## 2024-09-05 ASSESSMENT — PATIENT HEALTH QUESTIONNAIRE - PHQ9: 5. POOR APPETITE OR OVEREATING: NEARLY EVERY DAY

## 2024-09-05 NOTE — PROGRESS NOTES
"Los is a 59 year old who is being evaluated via a billable video visit.      If patient has telephone visit, have they been educated on video visit as preferred visit method and offered to change to video visit? yes        Instructions Relayed to Patient by Virtual Roomer:     Patient is active on gdgt:   Relayed following to patient: \"It looks like you are active on [employer], are you able to join the visit this way? If not, do you need us to send you a link now or would you like your provider to send a link via text or email when they are ready to initiate the visit?\"      Patient Confirmed they will join visit via: Email   Reminded patient to ensure they were logged on to virtual visit by arrival time listed.   Asked if patient has flexibility to initiate visit sooner than arrival time: patient stated yes, documented in appointment notes availability to initiate visit earlier than arrival time     If pediatric virtual visit, ensured pediatric patient along with parent/guardian will be present for video visit.     Patient offered the website [email or web address] and/or phone number to gdgt Help line: [phone] How would you like to obtain your AVS? [employer]  If the video visit is dropped, the invitation should be resent by: Send to e-mail at: [email or web address]  Will anyone else be joining your video visit? No      Assessment & Plan     EDIS (generalized anxiety disorder)  -History of anxiety that was well-controlled with use of sertraline.  This was tapered off by patient 3/24 secondary to concerns about drooping of eyelid caused by sertraline.  Patient was evaluated by ophthalmology for this  -Since anxiety symptoms had been adequately controlled, patient had opted to stay off medication, however more recently anxiety symptoms have recurred  -No other selective serotonin reuptake inhibitor used in the past besides sertraline  -Start escitalopram at 10 mg daily  -If mood symptoms are " stable, can follow-up in 6 months  - escitalopram (LEXAPRO) 10 MG tablet  Dispense: 90 tablet; Refill: 1    We discussed the treatment for anxiety and depression in detail.  The importance of a multi faceted approach in controlling symptoms was reviewed.  The benefits of cognitive behavioral therapy reviewed, benefits of exercise, and stress reduction also discussed.    It may take 3-4 weeks before symptom improvement happens.  Do not stop medication suddenly, medication will need to be tapered off.  Slight increased risk of suicide with SSRI group of medications discussed.        Brigido Madison is a 59 year old, presenting for the following health issues:  Anxiety        9/5/2024    11:03 AM   Additional Questions   Roomed by Rajat BALDERAS     History of Present Illness       Reason for visit:  Anxiety    He eats 2-3 servings of fruits and vegetables daily.He consumes 0 sweetened beverage(s) daily.He exercises with enough effort to increase his heart rate 30 to 60 minutes per day.  He exercises with enough effort to increase his heart rate 7 days per week.   He is taking medications regularly.     Patient used to be on sertraline for management of anxiety, he had tapered this off secondary to concern about dry eyes and drooping of eyelid, was seen by EYE, patient felt caused by the medication.  This was around March 24, at that time anxiety symptoms were adequately managed and patient had opted not to be on any medication.    Has had more anxiety  No thoughts of self harm  No other medication besides Sertraline used in the past.     Anxiety   How are you doing with your anxiety since your last visit? Worsened   Are you having other symptoms that might be associated with anxiety? No  Have you had a significant life event? No   Are you feeling depressed? No  Do you have any concerns with your use of alcohol or other drugs? No    Social History     Tobacco Use    Smoking status: Former     Current packs/day: 0.00      Average packs/day: 1.5 packs/day for 15.0 years (22.5 ttl pk-yrs)     Types: Cigarettes     Start date: 1975     Quit date: 1990     Years since quittin.0    Smokeless tobacco: Never   Vaping Use    Vaping status: Never Used   Substance Use Topics    Alcohol use: Yes     Comment: 6-10 drinks weekly or every other week    Drug use: No         2024    11:00 AM   EDIS-7 SCORE   Total Score 18          No data to display                    Objective           Vitals:  No vitals were obtained today due to virtual visit.    Physical Exam   GENERAL: alert and no distress  EYES: Eyes grossly normal to inspection.  No discharge or erythema, or obvious scleral/conjunctival abnormalities.  RESP: No audible wheeze, cough, or visible cyanosis.    SKIN: Visible skin clear. No significant rash, abnormal pigmentation or lesions.  NEURO: Cranial nerves grossly intact.  Mentation and speech appropriate for age.  PSYCH: Appropriate affect, tone, and pace of words        Video-Visit Details    Type of service:  Video Visit   Originating Location (pt. Location): Home    Distant Location (provider location):  On-site  Platform used for Video Visit: Christina  Signed Electronically by: Maris Basurto MD MPH

## 2024-10-10 DIAGNOSIS — I10 BENIGN ESSENTIAL HYPERTENSION: ICD-10-CM

## 2024-10-10 RX ORDER — LISINOPRIL 20 MG/1
TABLET ORAL
Qty: 90 TABLET | Refills: 1 | Status: SHIPPED | OUTPATIENT
Start: 2024-10-10

## 2025-02-25 DIAGNOSIS — F41.1 GAD (GENERALIZED ANXIETY DISORDER): ICD-10-CM

## 2025-02-25 RX ORDER — ESCITALOPRAM OXALATE 10 MG/1
TABLET ORAL
Qty: 90 TABLET | Refills: 0 | Status: SHIPPED | OUTPATIENT
Start: 2025-02-25

## 2025-02-25 NOTE — TELEPHONE ENCOUNTER
90 day refill provided. Patient should schedule a follow up, Video visit OK. Thank you, Maris Basurto MD MPH    Injectable 12 Free Text Discount (In Dollars- Use Only Numbers And Decimals): 0.00 Injectable 2 Units: 0 Body Procedure 3: HydraFacial (Back) Face Procedure 2: SkinPen Face Procedure 5 Units: 1 Misc Procedure 1 Price/Unit (In Dollars- Use Only Numbers And Decimals): 499 Face Procedure 3 Price/Unit (In Dollars- Use Only Numbers And Decimals): 265 Face Procedure 6 Price/Unit (In Dollars- Use Only Numbers And Decimals): 389 Body Procedure 1: VI Peel Body (small area) Include Sales Tax On Facility Fees: No Face Procedure 10 Zimmer/Unit (In Dollars- Use Only Numbers And Decimals): 369 Face Procedure 7 Price/Unit (In Dollars- Use Only Numbers And Decimals): 199 Face Procedure 1 Price/Unit (In Dollars- Use Only Numbers And Decimals): 150 Body Procedure 2 Price/Unit (In Dollars- Use Only Numbers And Decimals): 500 Face Procedure 5: Vi Peel Purify w/ Precision Plus Face Procedure 8: ZO 3 Step Peel Face Procedure 8 Price/Unit (In Dollars- Use Only Numbers And Decimals): 319 Body Procedure 3 Price/Unit (In Dollars- Use Only Numbers And Decimals): 299 Face Procedure 2 Price/Unit (In Dollars- Use Only Numbers And Decimals): 329 Face Procedure 9: VI Peel (Original) Include Sales Tax On Surgeon's Fees: Yes Misc Procedure 1: HydraFacial Keravive Face Procedure 6: VI Peel Precision Plus Face Procedure 3: HydraFacial (Deluxe) Face Procedure 10: VI Peel (Purify) Face Procedure 4: Vi Peel (Advanced) Face Procedure 7: HydraFacial (Signature) Face Procedure 1: Milia extraction Body Procedure 2: VI Peel Body (large area) Detail Level: Zone

## 2025-03-07 DIAGNOSIS — K21.9 GASTROESOPHAGEAL REFLUX DISEASE WITHOUT ESOPHAGITIS: ICD-10-CM

## 2025-03-10 RX ORDER — OMEPRAZOLE 20 MG/1
CAPSULE, DELAYED RELEASE ORAL
Qty: 90 CAPSULE | Refills: 1 | Status: SHIPPED | OUTPATIENT
Start: 2025-03-10

## 2025-03-12 ENCOUNTER — VIRTUAL VISIT (OUTPATIENT)
Dept: FAMILY MEDICINE | Facility: CLINIC | Age: 60
End: 2025-03-12
Payer: COMMERCIAL

## 2025-03-12 DIAGNOSIS — E78.2 MIXED HYPERLIPIDEMIA: ICD-10-CM

## 2025-03-12 DIAGNOSIS — F41.1 GAD (GENERALIZED ANXIETY DISORDER): ICD-10-CM

## 2025-03-12 DIAGNOSIS — N52.9 ERECTILE DYSFUNCTION, UNSPECIFIED ERECTILE DYSFUNCTION TYPE: ICD-10-CM

## 2025-03-12 DIAGNOSIS — F10.10 ALCOHOL CONSUMPTION BINGE DRINKING: ICD-10-CM

## 2025-03-12 DIAGNOSIS — I25.10 CORONARY ARTERY DISEASE INVOLVING NATIVE HEART WITHOUT ANGINA PECTORIS, UNSPECIFIED VESSEL OR LESION TYPE: ICD-10-CM

## 2025-03-12 DIAGNOSIS — K21.9 GASTROESOPHAGEAL REFLUX DISEASE WITHOUT ESOPHAGITIS: ICD-10-CM

## 2025-03-12 DIAGNOSIS — I10 BENIGN ESSENTIAL HYPERTENSION: Primary | ICD-10-CM

## 2025-03-12 PROCEDURE — 98005 SYNCH AUDIO-VIDEO EST LOW 20: CPT | Performed by: PREVENTIVE MEDICINE

## 2025-03-12 RX ORDER — ESCITALOPRAM OXALATE 10 MG/1
10 TABLET ORAL DAILY
Qty: 90 TABLET | Refills: 1 | Status: SHIPPED | OUTPATIENT
Start: 2025-03-12

## 2025-03-12 RX ORDER — FAMOTIDINE 40 MG/1
40 TABLET, FILM COATED ORAL
Qty: 30 TABLET | Refills: 0 | Status: SHIPPED | OUTPATIENT
Start: 2025-03-12

## 2025-03-12 RX ORDER — LISINOPRIL 20 MG/1
20 TABLET ORAL DAILY
Qty: 90 TABLET | Refills: 3 | Status: SHIPPED | OUTPATIENT
Start: 2025-03-12

## 2025-03-12 RX ORDER — SIMVASTATIN 40 MG
40 TABLET ORAL AT BEDTIME
Qty: 90 TABLET | Refills: 3 | Status: SHIPPED | OUTPATIENT
Start: 2025-03-12

## 2025-03-12 RX ORDER — SILDENAFIL 100 MG/1
TABLET, FILM COATED ORAL
Qty: 30 TABLET | Refills: 4 | Status: SHIPPED | OUTPATIENT
Start: 2025-03-12

## 2025-03-22 ENCOUNTER — HEALTH MAINTENANCE LETTER (OUTPATIENT)
Age: 60
End: 2025-03-22

## 2025-04-16 ENCOUNTER — VIRTUAL VISIT (OUTPATIENT)
Dept: FAMILY MEDICINE | Facility: CLINIC | Age: 60
End: 2025-04-16
Attending: PREVENTIVE MEDICINE
Payer: COMMERCIAL

## 2025-04-16 DIAGNOSIS — I25.10 CORONARY ARTERY DISEASE INVOLVING NATIVE HEART WITHOUT ANGINA PECTORIS, UNSPECIFIED VESSEL OR LESION TYPE: ICD-10-CM

## 2025-04-16 DIAGNOSIS — I10 BENIGN ESSENTIAL HYPERTENSION: ICD-10-CM

## 2025-04-16 DIAGNOSIS — L65.9 HAIR THINNING: Primary | ICD-10-CM

## 2025-04-16 PROCEDURE — 98013 SYNCH AUDIO-ONLY EST LOW 20: CPT | Performed by: PREVENTIVE MEDICINE

## 2025-04-16 ASSESSMENT — ENCOUNTER SYMPTOMS: NERVOUS/ANXIOUS: 1

## 2025-04-16 ASSESSMENT — ANXIETY QUESTIONNAIRES
5. BEING SO RESTLESS THAT IT IS HARD TO SIT STILL: MORE THAN HALF THE DAYS
IF YOU CHECKED OFF ANY PROBLEMS ON THIS QUESTIONNAIRE, HOW DIFFICULT HAVE THESE PROBLEMS MADE IT FOR YOU TO DO YOUR WORK, TAKE CARE OF THINGS AT HOME, OR GET ALONG WITH OTHER PEOPLE: SOMEWHAT DIFFICULT
8. IF YOU CHECKED OFF ANY PROBLEMS, HOW DIFFICULT HAVE THESE MADE IT FOR YOU TO DO YOUR WORK, TAKE CARE OF THINGS AT HOME, OR GET ALONG WITH OTHER PEOPLE?: SOMEWHAT DIFFICULT
3. WORRYING TOO MUCH ABOUT DIFFERENT THINGS: NOT AT ALL
7. FEELING AFRAID AS IF SOMETHING AWFUL MIGHT HAPPEN: NOT AT ALL
7. FEELING AFRAID AS IF SOMETHING AWFUL MIGHT HAPPEN: NOT AT ALL
GAD7 TOTAL SCORE: 8
GAD7 TOTAL SCORE: 8
6. BECOMING EASILY ANNOYED OR IRRITABLE: MORE THAN HALF THE DAYS
1. FEELING NERVOUS, ANXIOUS, OR ON EDGE: MORE THAN HALF THE DAYS
2. NOT BEING ABLE TO STOP OR CONTROL WORRYING: NOT AT ALL
4. TROUBLE RELAXING: MORE THAN HALF THE DAYS
GAD7 TOTAL SCORE: 8

## 2025-04-16 NOTE — PROGRESS NOTES
"  If patient has telephone visit, have they been educated on video visit as preferred visit method and offered to change to video visit? yes        Instructions Relayed to Patient by Virtual Roomer:     Patient is active on [employer]:   Relayed following to patient: \"It looks like you are active on [employer], are you able to join the visit this way? If not, do you need us to send you a link now or would you like your provider to send a link via text or email when they are ready to initiate the visit?\"      Patient Confirmed they will join visit via: Provider to call patient for telephone visit   Reminded patient to ensure they were logged on to virtual visit by arrival time listed.   Asked if patient has flexibility to initiate visit sooner than arrival time: patient stated yes, documented in appointment notes availability to initiate visit earlier than arrival time     If pediatric virtual visit, ensured pediatric patient along with parent/guardian will be present for video visit.     Patient offered the website [email or web address] and/or phone number to [employer] Help line: [phone]       Los is a 60 year old who is being evaluated via a billable telephone visit.      Originating Location (pt. Location): Home    Distant Location (provider location):  On-site  Telephone visit completed due to the patient did not consent to a video visit.    Assessment & Plan     Hair thinning  - Patient with concerns for hair thinning over the last few months there is also increased shedding associated with it.  No patches of alopecia, no scalp changes associated.  Differentials considered include male pattern baldness, or telogen effluvium.  - Patient is due for labs, will schedule a lab only appointment  - I also reviewed using Rogaine foam for men over the counter  - Patient did have questions about using finasteride for hair growth.  Defer for now till labs are done    Benign essential hypertension  -continue " medication     Coronary artery disease involving native heart without angina pectoris, unspecified vessel or lesion type  - history of coronary artery disease and RCA stent previously placed.   -Nuclear stress test 3/24:      The nuclear stress test is negative for inducible myocardial ischemia or infarction.    LVEDv 146ml. LVESv 38ml. LV EF 74%.    There is no prior study for comparison.     -coronary angiogram done April 2019:     Right dominant   LM - mild luminal irregularities   LAD - mild disease   LCx - small territory (supplies OM1 or Ramus territory as large RPLA supplies majority of lateral myocardium), ostial 50%, remainder of vessel with mild disease   RCA - mRCA patent stent, remainder of vessel with mild disease     Left Heart Catheterization:   LVEDP - 9 mmHg   No LV angiogram performed to limit contrast exposure.   No evidence of aortic stenosis.                Follow-up    Follow-up Visit   Expected date:  Apr 23, 2025 (Approximate)      Follow Up Appointment Details:     Follow-up with whom?: Other Primary Care Services    Follow-Up for what?: Lab Visit    How?: In Person                 Brigido Madison is a 60 year old, presenting for the following health issues:  Anxiety (Notice axiety meds are making his hair fall out wants to talk about different meds )    History of Present Illness       Mental Health Follow-up:  Patient presents to follow-up on Anxiety.    Patient's anxiety since last visit has been:  Better  The patient is having other symptoms associated with anxiety.  Any significant life events: No  Patient is not feeling anxious or having panic attacks.  Patient has no concerns about alcohol or drug use.         Says he was taking sone anxiety meds and he thinks its making his hair fall out so he slowed down but want to talk about it and also wants Tums to control his heartburn refill on meds.    Has noticed that hair is thinning  More shedding of hair  NO bald spots  Receding hair  line+  No recent illness, No Covid  No scalp changes  Noticed not as thick   Male pattern baldness family history+  Also had questions about using Finasteride for hair loss     Due for labs +      Objective           Vitals:  No vitals were obtained today due to virtual visit.    Physical Exam   General: Alert and no distress //Respiratory: No audible wheeze, cough, or shortness of breath // Psychiatric:  Appropriate affect, tone, and pace of words      No results found for this or any previous visit (from the past 24 hours).      Phone call duration: 11 minutes  Signed Electronically by: Maris Basurto MD

## 2025-04-16 NOTE — PATIENT INSTRUCTIONS
At [facility], we strive to deliver an exceptional experience to you, every time we see you. If you receive a survey, please let us know what we are doing well and/or what we could improve upon, as we do value your feedback.  If you have MyChart, you can expect to receive results automatically within 24 hours of their completion.  Your provider will send a note interpreting your results as well.   If you do not have MyChart, you should receive your results in about a week by mail.    Your care team:                            Family Medicine Internal Medicine   MD Matthew Oliver, MD Coby Torres, MD Dereje Foreman, MD Krystal Andres, PA-C    Leroy Hsu, MD Pediatrics   Maris Basurto, MD Bertha Hicks, MAURY Flores CNP Priyanka Adams, MD Meenakshi Hutchins, MD Marley Velazco, CNP     Waleska Coughlin, PA-C Same-Day Provider (No follow-up visits)   MAURY Chowdary, LONG Raza, PA-C    Zeina Lopez PA-C     Clinic hours: Monday - Thursday 7 am-6 pm; Fridays 7 am-5 pm.   Urgent care: Monday - Friday 10 am- 8 pm; Saturday and Sunday 9 am-5 pm.    Clinic: [phone]       [facility]: Monday - Thursday 8 am - 7 pm; Friday 8 am - 6 pm  [facility]: [phone]

## (undated) RX ORDER — REGADENOSON 0.08 MG/ML
INJECTION, SOLUTION INTRAVENOUS
Status: DISPENSED
Start: 2024-03-25